# Patient Record
Sex: MALE | Race: WHITE
[De-identification: names, ages, dates, MRNs, and addresses within clinical notes are randomized per-mention and may not be internally consistent; named-entity substitution may affect disease eponyms.]

---

## 2017-01-17 ENCOUNTER — APPOINTMENT (OUTPATIENT)
Dept: PULMONOLOGY | Facility: CLINIC | Age: 72
End: 2017-01-17

## 2017-02-17 ENCOUNTER — APPOINTMENT (OUTPATIENT)
Dept: PULMONOLOGY | Facility: CLINIC | Age: 72
End: 2017-02-17

## 2017-02-17 VITALS
SYSTOLIC BLOOD PRESSURE: 110 MMHG | WEIGHT: 208 LBS | BODY MASS INDEX: 27.57 KG/M2 | HEART RATE: 67 BPM | OXYGEN SATURATION: 100 % | DIASTOLIC BLOOD PRESSURE: 80 MMHG | HEIGHT: 73 IN | RESPIRATION RATE: 17 BRPM

## 2017-06-15 ENCOUNTER — APPOINTMENT (OUTPATIENT)
Dept: PULMONOLOGY | Facility: CLINIC | Age: 72
End: 2017-06-15

## 2017-06-15 VITALS
BODY MASS INDEX: 27.57 KG/M2 | DIASTOLIC BLOOD PRESSURE: 60 MMHG | RESPIRATION RATE: 14 BRPM | WEIGHT: 208 LBS | HEART RATE: 77 BPM | OXYGEN SATURATION: 98 % | SYSTOLIC BLOOD PRESSURE: 105 MMHG | HEIGHT: 73 IN

## 2017-06-15 LAB
T3FREE SERPL-MCNC: 3.12 PG/ML
T4 FREE SERPL-MCNC: 1.2 NG/DL
TSH SERPL-ACNC: 2.51 UIU/ML

## 2017-06-16 ENCOUNTER — RESULT REVIEW (OUTPATIENT)
Age: 72
End: 2017-06-16

## 2017-06-17 LAB
TESTOST BND SERPL-MCNC: 5.6 PG/ML
TESTOST SERPL-MCNC: 468.2 NG/DL

## 2017-06-29 ENCOUNTER — RESULT REVIEW (OUTPATIENT)
Age: 72
End: 2017-06-29

## 2017-11-16 ENCOUNTER — APPOINTMENT (OUTPATIENT)
Dept: PULMONOLOGY | Facility: CLINIC | Age: 72
End: 2017-11-16

## 2017-12-28 ENCOUNTER — RX RENEWAL (OUTPATIENT)
Age: 72
End: 2017-12-28

## 2018-02-22 ENCOUNTER — APPOINTMENT (OUTPATIENT)
Dept: PULMONOLOGY | Facility: CLINIC | Age: 73
End: 2018-02-22
Payer: MEDICARE

## 2018-02-22 VITALS
WEIGHT: 201 LBS | HEART RATE: 71 BPM | SYSTOLIC BLOOD PRESSURE: 120 MMHG | HEIGHT: 72 IN | DIASTOLIC BLOOD PRESSURE: 60 MMHG | RESPIRATION RATE: 14 BRPM | OXYGEN SATURATION: 95 % | BODY MASS INDEX: 27.22 KG/M2

## 2018-02-22 PROCEDURE — 99214 OFFICE O/P EST MOD 30 MIN: CPT | Mod: 25

## 2018-02-22 PROCEDURE — 94010 BREATHING CAPACITY TEST: CPT

## 2018-02-22 RX ORDER — AZITHROMYCIN 500 MG/1
500 TABLET, FILM COATED ORAL DAILY
Qty: 5 | Refills: 0 | Status: DISCONTINUED | COMMUNITY
Start: 2017-12-28 | End: 2018-02-22

## 2018-02-22 RX ORDER — AZITHROMYCIN 250 MG/1
250 TABLET, FILM COATED ORAL
Qty: 6 | Refills: 0 | Status: DISCONTINUED | COMMUNITY
Start: 2017-06-15 | End: 2018-02-22

## 2018-04-25 ENCOUNTER — RX RENEWAL (OUTPATIENT)
Age: 73
End: 2018-04-25

## 2018-05-24 ENCOUNTER — APPOINTMENT (OUTPATIENT)
Dept: ORTHOPEDIC SURGERY | Facility: CLINIC | Age: 73
End: 2018-05-24
Payer: MEDICARE

## 2018-05-24 VITALS
WEIGHT: 206 LBS | HEART RATE: 79 BPM | DIASTOLIC BLOOD PRESSURE: 65 MMHG | HEIGHT: 73 IN | BODY MASS INDEX: 27.3 KG/M2 | SYSTOLIC BLOOD PRESSURE: 106 MMHG

## 2018-05-24 DIAGNOSIS — M67.911 UNSPECIFIED DISORDER OF SYNOVIUM AND TENDON, RIGHT SHOULDER: ICD-10-CM

## 2018-05-24 DIAGNOSIS — M75.41 IMPINGEMENT SYNDROME OF RIGHT SHOULDER: ICD-10-CM

## 2018-05-24 PROCEDURE — 20610 DRAIN/INJ JOINT/BURSA W/O US: CPT | Mod: RT

## 2018-05-24 PROCEDURE — 99203 OFFICE O/P NEW LOW 30 MIN: CPT | Mod: 25

## 2018-05-24 PROCEDURE — 73030 X-RAY EXAM OF SHOULDER: CPT | Mod: RT

## 2018-06-21 ENCOUNTER — NON-APPOINTMENT (OUTPATIENT)
Age: 73
End: 2018-06-21

## 2018-06-21 ENCOUNTER — APPOINTMENT (OUTPATIENT)
Dept: PULMONOLOGY | Facility: CLINIC | Age: 73
End: 2018-06-21
Payer: MEDICARE

## 2018-06-21 VITALS
SYSTOLIC BLOOD PRESSURE: 110 MMHG | WEIGHT: 205 LBS | BODY MASS INDEX: 27.17 KG/M2 | HEART RATE: 87 BPM | HEIGHT: 73 IN | OXYGEN SATURATION: 98 % | RESPIRATION RATE: 17 BRPM | DIASTOLIC BLOOD PRESSURE: 65 MMHG

## 2018-06-21 DIAGNOSIS — Z78.9 OTHER SPECIFIED HEALTH STATUS: ICD-10-CM

## 2018-06-21 DIAGNOSIS — Z82.61 FAMILY HISTORY OF ARTHRITIS: ICD-10-CM

## 2018-06-21 DIAGNOSIS — Z87.09 PERSONAL HISTORY OF OTHER DISEASES OF THE RESPIRATORY SYSTEM: ICD-10-CM

## 2018-06-21 PROCEDURE — 99214 OFFICE O/P EST MOD 30 MIN: CPT | Mod: 25

## 2018-06-21 PROCEDURE — 94010 BREATHING CAPACITY TEST: CPT

## 2018-06-21 RX ORDER — ESOMEPRAZOLE MAGNESIUM 40 MG/1
CAPSULE, DELAYED RELEASE ORAL
Refills: 0 | Status: ACTIVE | COMMUNITY

## 2018-06-21 RX ORDER — OSELTAMIVIR PHOSPHATE 75 MG/1
75 CAPSULE ORAL
Qty: 10 | Refills: 0 | Status: DISCONTINUED | COMMUNITY
Start: 2018-02-12

## 2018-06-21 RX ORDER — MULTIVITAMIN
TABLET ORAL
Refills: 0 | Status: ACTIVE | COMMUNITY

## 2018-10-25 ENCOUNTER — APPOINTMENT (OUTPATIENT)
Dept: PULMONOLOGY | Facility: CLINIC | Age: 73
End: 2018-10-25
Payer: MEDICARE

## 2018-10-25 ENCOUNTER — NON-APPOINTMENT (OUTPATIENT)
Age: 73
End: 2018-10-25

## 2018-10-25 VITALS
SYSTOLIC BLOOD PRESSURE: 121 MMHG | OXYGEN SATURATION: 99 % | DIASTOLIC BLOOD PRESSURE: 86 MMHG | HEIGHT: 72 IN | WEIGHT: 208 LBS | BODY MASS INDEX: 28.17 KG/M2 | RESPIRATION RATE: 17 BRPM | HEART RATE: 72 BPM

## 2018-10-25 PROCEDURE — 94010 BREATHING CAPACITY TEST: CPT

## 2018-10-25 PROCEDURE — 99214 OFFICE O/P EST MOD 30 MIN: CPT | Mod: 25

## 2018-10-25 NOTE — ADDENDUM
[FreeTextEntry1] : Documented by Radha Rain acting as a scribe for Dr. Joel Gould on 10/25/2018.\par \par All medical record entries made by the Scribe were at my, Dr. Joel Gould's, direction and personally dictated by me on 10//25/2018. I have reviewed the chart and agree that the record accurately reflects my personal performance of the history, physical exam, assessment and plan. I have also personally directed, reviewed, and agree with the discharge instructions.

## 2018-10-25 NOTE — ASSESSMENT
[FreeTextEntry1] : Mr. Urbano has a history of RADs, allergies, GERD, and low vitamin D.  He is doing well from a pulmonary perspective aside from some dexterity issues.\par \par problem 1: RADs\par -quiet at this point in time\par -on the shelf is Dulera 200 at 2 inhalations BID\par \par -Inhaler technique reviewed as well as oral hygiene techniques reviewed with patient. Avoidance of cold air, extremes of temperature, rescue inhaler should be used before exercise. Order of medication reviewed with patient. Recommended use of a cool mist humidifier in the bedroom.\par \par problem 2: post nasal drip syndrome\par -continue to use XKlear or Arm and Hammer nasal saline\par \par -Environmental measures for allergies were encouraged including mattress and pillow cover, air purifier, and environmental controls.\par \par problem 3: GERD\par -continue Nexium 20mg every morning \par -continue Pepcid PRN\par -discussed Rule of 2's; pt should avoid eating too much; too fast; too spicy; too lousy; less than two hours before bed \par -Things to avoid including overeating, spicy foods, tight clothing, eating within three hours of bed, this list is not all inclusive. \par -For treatment of reflux, possible options discussed including diet control, H2 blockers, PPIs, as well as coating motility agents discussed as treatment options. Timing of meals and proximity of last meal to sleep were discussed. If symptoms persist, a formal gastrointestinal evaluation is needed. \par \par problem 4: overweight\par -Weight loss, exercise, and diet control were discussed and are highly encouraged. Treatment options were given such as, aqua therapy, and contacting a nutritionist. Recommended to use the elliptical, stationary bike, less use of treadmill.  Obesity is associated with worsening asthma, shortness of breath, and potential for cardiac disease, diabetes, and other underlying medical conditions.\par \par problem 5: low vitamin D\par -continue vitamin D therapy\par \par -Has been associated with asthma exacerbations and increased allergic symptoms. The goal based on recent information is maintaining levels between 50-70 and low normal is 30. Recommended 50,000 units every two weeks to once a month depending on the level. \par \par problem 6: chronic fatigue, snoring, r/o sleep apnea\par -pt is being sent for blood work: thyroid function test (normal) , free and total testosterone level (low)\par -based on fatigue, snoring pt is being set up for a home sleep study (refused)\par \par -Sleep apnea is associated with adverse clinical consequences which an affect most organ systems. Cardiovascular disease risk includes arrhythmias, atrial fibrillation, hypertension, coronary artery disease, and stroke. Metabolic disorders include diabetes type 2, non-alcoholic fatty liver disease. Mood disorder especially depression; and cognitive decline especially in the elderly. Associations with chronic reflux/Shane’s esophagus some but not all inclusive.\par -Reasons include arousal consistent with hypopnea; respiratory events most prominent in REM sleep or supine position; therefore sleep staging and body position are important for accurate diagnosis and estimation of AHI.\par -aside from OSAS other etiologies include thyroid disease, anemia, low testosterone levels,\par and vitamin deficiencies as well as medication effects. Based on this recommended: CBC, thyroid function test, vitamin D levels, sleep study, and free and total testosterone levels. Dr. Gould went over topic multiple times and discussed for an extensive period of time. \par \par problem 7: low testosterone\par -continue Boron supplement 6mg QD\par \par problem 8: low vitamin D \par -add vitamin D supplement \par Has been associated with asthma exacerbations and increased allergic symptoms. The goal based on recent information is maintaining levels between 50-70 and low normal is 30. Recommended 50,000 units every two weeks to once a month depending on the level \par \par problem 9: health maintenance \par -pending 2018 influenza vaccination\par -recommended to increase hydration\par -recommended to try MouthKote\par -recommended strep pneumonia vaccines: Prevnar-13 vaccine, followed by Pneumo vaccine 23 one year following\par -recommended early intervention for URIs\par -recommended regular osteoporosis evaluations\par -recommended early dermatological evaluations\par -recommended after the age of 50 to the age of 70, colonoscopy every 5 years \par  \par \par F/U in 4 months\par He is encouraged to call with any changes, concerns, or questions.

## 2018-10-25 NOTE — HISTORY OF PRESENT ILLNESS
[FreeTextEntry1] : Mr. Urbano is a 72 year old male presenting to the office for a follow up visit for chronic fatigue, GERD, low vitamin D, mild RADs, postnasal drip, and snoring. His chief complaint is dexterity issues. \par He notes he has lost control of his hand and cannot use it as well as he used to be able to\par -He reports he is "even drooling at times and does not feel it"\par -He states his PCP states there is a possibility he is developing Parkinson's disease\par -He reports his balance is poor at times\par -He states he has not been sleeping well secondary to shoulder pain/rotator cuff\par -He states his sense of smell and taste have been good\par -He reports he has been trying to exercise with free weight and activities that do not bother his shoulder\par -He states he does not hydrate well enough and it likely affects his voice\par -He states he has been taking Vitamin D supplements\par -He denies chest pain or pressure, diarrhea, constipation, dysphagia, heartburn, reflux,

## 2018-10-25 NOTE — REASON FOR VISIT
[Follow-Up] : a follow-up visit [FreeTextEntry1] : chronic fatigue, GERD, low vitamin D, mild RADs, postnasal drip, and snoring

## 2018-10-25 NOTE — PROCEDURE
[FreeTextEntry1] : PFT-spi reveals normal flows with FEV1 of 3.22  ,which is   86% of predicted, normal flow volume loop

## 2018-11-09 ENCOUNTER — APPOINTMENT (OUTPATIENT)
Dept: NUCLEAR MEDICINE | Facility: HOSPITAL | Age: 73
End: 2018-11-09
Payer: MEDICARE

## 2018-11-09 ENCOUNTER — OUTPATIENT (OUTPATIENT)
Dept: OUTPATIENT SERVICES | Facility: HOSPITAL | Age: 73
LOS: 1 days | End: 2018-11-09

## 2018-11-09 DIAGNOSIS — G20 PARKINSON'S DISEASE: ICD-10-CM

## 2018-11-09 PROCEDURE — 78607: CPT | Mod: 26

## 2019-01-04 ENCOUNTER — APPOINTMENT (OUTPATIENT)
Dept: NEUROLOGY | Facility: CLINIC | Age: 74
End: 2019-01-04

## 2019-01-18 ENCOUNTER — APPOINTMENT (OUTPATIENT)
Dept: NEUROLOGY | Facility: CLINIC | Age: 74
End: 2019-01-18
Payer: MEDICARE

## 2019-01-18 VITALS
HEART RATE: 74 BPM | DIASTOLIC BLOOD PRESSURE: 76 MMHG | HEIGHT: 72 IN | BODY MASS INDEX: 28.17 KG/M2 | SYSTOLIC BLOOD PRESSURE: 120 MMHG | WEIGHT: 208 LBS

## 2019-01-18 PROCEDURE — 99205 OFFICE O/P NEW HI 60 MIN: CPT

## 2019-01-18 RX ORDER — RASAGILINE 1 MG/1
1 TABLET ORAL
Qty: 30 | Refills: 5 | Status: ACTIVE | COMMUNITY
Start: 2019-01-18

## 2019-01-18 NOTE — HISTORY OF PRESENT ILLNESS
[FreeTextEntry1] : The patient is a 73-year-old right-handed man who comes for a second opinion for parkinsonism (previously seen by Dr. Delgadillo). Last year he developed decreased the severity of the left hand and was referred to Dr. Delgadillo by his primary care doctor. Workup included MRI of the brain which showed mild microvascular disease, as well as the cervical spine showed multilevel disc disease. On November 9, 2018 he had a Fidel scan which showed reduced tracer uptake on the right.\par \par Because of the Fidel scan, he was started on nizatidine, initially 0.5 mg, then increase to 1 mg, which helped his drooling, but his sister is concerned it made him sleepier.\par There've been no changes in the facial expression, but his voice is hoarse her. He does have some drooling during the day. She has no dysphagia. He is mild trouble turning over in bed, which he attributes to a right rotator cuff tear. His handwriting is unchanged, and he is now told activities of daily living or work. He has noticed mild dragging of the left foot on a treadmill, which he does daily. Otherwise his gait is unaffected, and he has not fallen. He has no tremor. \par He has no history of acting out his dreams, but does snore. He is slightly cranky are than usual, but has no depression or anxiety. His memory is good without hallucinations. He has no constipation, changes in smell, urinary issues, or orthostasis. There is no family history of Parkinson's disease or other movement disorders. He was exposed to Agent Orange in [place].\par He has no other neurologic complaints, including no diplopia, numbness, or weakness.

## 2019-01-18 NOTE — DISCUSSION/SUMMARY
[FreeTextEntry1] : In summary, the patient is a 73-year-old right-handed man with less than a year history of decreased dexterity in the left arm. Examination was significant for left sided rigidity and slowing, and mild hypomimia. A Fidel scan showed decreased uptake on the right. Overall, the history and examination is indeed probably most consistent with idiopathic Parkinson's disease. Given the strict asymmetry, I cannot rule out an atypical parkinsonian disorder at this time, though there were no findings and examination that would be specific for one. He was exposed to Agent Orange in the amount which does pose a risk factor for Parkinson's disease, and he is in the process of applying for [place] benefits.\par As for treatment, he's currently taking rasagiline, and is not sure that even is helping him. We discussed the lack of clear data on disease modification, and he is concerned about the cost of the medication. Thus, I recommended a trial of one to 2 weeks without the medication to see if it is indeed making a difference. At any point during this time he can decide if you have to go back on it or not. We also discussed symptomatic medications including dopamine agonists and levodopa. At this point, he is not bothered by his symptoms, and thus there is no reason to add any of them. He does exercise regularly, which I've encouraged him to continue.\par \par I spent approximately 60 minutes with the patient and his family, examining and discussing the above finding and impression. Followup will be in 4-5 months, sooner if new problems arise.

## 2019-01-18 NOTE — PHYSICAL EXAM
[Motor Handedness Right-Handed] : the patient is right hand dominant [Vibration Decrease Leg / Foot Left Toes] : decreased at the toes of the left foot [Person] : oriented to person [Place] : oriented to place [Time] : oriented to time [General Appearance - Alert] : alert [General Appearance - Well Nourished] : well nourished [General Appearance - Well Developed] : well developed [Affect] : the affect was normal [Mood] : the mood was normal [Short Term Intact] : short term memory intact [Remote Intact] : remote memory intact [Registration Intact] : recent registration memory intact [Concentration Intact] : normal concentrating ability [Naming Objects] : no difficulty naming common objects [Repeating Phrases] : no difficulty repeating a phrase [Writing A Sentence] : no difficulty writing a sentence [Fluency] : fluency intact [Comprehension] : comprehension intact [Reading] : reading intact [Cranial Nerves Optic (II)] : visual acuity intact bilaterally,  visual fields full to confrontation, pupils equal round and reactive to light [Cranial Nerves Oculomotor (III)] : extraocular motion intact [Cranial Nerves Trigeminal (V)] : facial sensation intact symmetrically [Cranial Nerves Facial (VII)] : face symmetrical [Cranial Nerves Vestibulocochlear (VIII)] : hearing was intact bilaterally [Cranial Nerves Glossopharyngeal (IX)] : tongue and palate midline [Cranial Nerves Accessory (XI - Cranial And Spinal)] : head turning and shoulder shrug symmetric [Cranial Nerves Hypoglossal (XII)] : there was no tongue deviation with protrusion [Motor Strength] : muscle strength was normal in all four extremities [Sensation Tactile Decrease] : light touch was intact [Proprioception] : proprioception was intact [1+] : Ankle jerk left 1+ [Sclera] : the sclera and conjunctiva were normal [PERRL With Normal Accommodation] : pupils were equal in size, round, reactive to light, with normal accommodation [Extraocular Movements] : extraocular movements were intact [Outer Ear] : the ears and nose were normal in appearance [Hearing Threshold Finger Rub Not [place]] : hearing was normal [Neck Appearance] : the appearance of the neck was normal [Arterial Pulses Carotid] : carotid pulses were normal with no bruits [Bowel Sounds] : normal bowel sounds [Abdomen Soft] : soft [Abdomen Tenderness] : non-tender [No CVA Tenderness] : no ~M costovertebral angle tenderness [Nail Clubbing] : no clubbing  or cyanosis of the fingernails [Musculoskeletal - Swelling] : no joint swelling seen [Skin Color & Pigmentation] : normal skin color and pigmentation [Skin Turgor] : normal skin turgor [] : no rash [Vibration Decrease Arm / Hand Bilateral] : normal in both arms and hands [Vibration Decrease Leg / Foot Both Ankles] : normal at both ankles [Vibration Decrease Leg / Foot Right Toes] : normal at the toes of the right foot [Plantar Reflex Right Only] : normal on the right [Plantar Reflex Left Only] : normal on the left [FreeTextEntry1] : He did have decreased blink rate and mildly decreased facial expression. Voice was normal. Extraocular movements were intact with normal saccades, smooth pursuit, and no square wave jerks seen. Tone was minimally increased of the neck and mildly increased at the left upper extremity. Tone was otherwise normal. Rapid alternating movements were decreased in amplitude and speed at the left upper extremity. Foot tapping was normal bilaterally. He easily got up from the chair without using his arms. Gait was normal based and steady with good heel to toe stride and decreased left arm swing. Pull test was negative. Handwriting was normal. He had no apraxia.

## 2019-05-02 ENCOUNTER — NON-APPOINTMENT (OUTPATIENT)
Age: 74
End: 2019-05-02

## 2019-05-02 ENCOUNTER — APPOINTMENT (OUTPATIENT)
Dept: PULMONOLOGY | Facility: CLINIC | Age: 74
End: 2019-05-02
Payer: MEDICARE

## 2019-05-02 VITALS
WEIGHT: 206 LBS | SYSTOLIC BLOOD PRESSURE: 110 MMHG | HEIGHT: 72 IN | BODY MASS INDEX: 27.9 KG/M2 | DIASTOLIC BLOOD PRESSURE: 80 MMHG | RESPIRATION RATE: 17 BRPM | HEART RATE: 75 BPM | OXYGEN SATURATION: 100 %

## 2019-05-02 PROCEDURE — 94010 BREATHING CAPACITY TEST: CPT

## 2019-05-02 PROCEDURE — 99214 OFFICE O/P EST MOD 30 MIN: CPT | Mod: 25

## 2019-05-02 NOTE — PHYSICAL EXAM
[General Appearance - Well Developed] : well developed [Normal Appearance] : normal appearance [Well Groomed] : well groomed [General Appearance - Well Nourished] : well nourished [No Deformities] : no deformities [General Appearance - In No Acute Distress] : no acute distress [Normal Conjunctiva] : the conjunctiva exhibited no abnormalities [Normal Oropharynx] : normal oropharynx [Eyelids - No Xanthelasma] : the eyelids demonstrated no xanthelasmas [Neck Appearance] : the appearance of the neck was normal [Neck Cervical Mass (___cm)] : no neck mass was observed [Jugular Venous Distention Increased] : there was no jugular-venous distention [Heart Sounds] : normal S1 and S2 [Thyroid Diffuse Enlargement] : the thyroid was not enlarged [Heart Rate And Rhythm] : heart rate and rhythm were normal [Thyroid Nodule] : there were no palpable thyroid nodules [Murmurs] : no murmurs present [Respiration, Rhythm And Depth] : normal respiratory rhythm and effort [Exaggerated Use Of Accessory Muscles For Inspiration] : no accessory muscle use [Auscultation Breath Sounds / Voice Sounds] : lungs were clear to auscultation bilaterally [Abdomen Soft] : soft [Abdomen Tenderness] : non-tender [Abdomen Mass (___ Cm)] : no abdominal mass palpated [Abnormal Walk] : normal gait [Gait - Sufficient For Exercise Testing] : the gait was sufficient for exercise testing [Nail Clubbing] : no clubbing of the fingernails [Cyanosis, Localized] : no localized cyanosis [Petechial Hemorrhages (___cm)] : no petechial hemorrhages [Skin Color & Pigmentation] : normal skin color and pigmentation [] : no rash [No Venous Stasis] : no venous stasis [Skin Lesions] : no skin lesions [No Skin Ulcers] : no skin ulcer [No Xanthoma] : no  xanthoma was observed [Deep Tendon Reflexes (DTR)] : deep tendon reflexes were 2+ and symmetric [Sensation] : the sensory exam was normal to light touch and pinprick [No Focal Deficits] : no focal deficits [Oriented To Time, Place, And Person] : oriented to person, place, and time [Impaired Insight] : insight and judgment were intact [Affect] : the affect was normal [III] : III [FreeTextEntry1] : wax in the ears

## 2019-05-02 NOTE — PROCEDURE
[FreeTextEntry1] : PFT revealed normal flows, with a FEV1 of 3.46 L, which is 93% of predicted, with a mildly irregular inspiratory limb

## 2019-05-02 NOTE — ASSESSMENT
[FreeTextEntry1] : Mr. Urbano has a history of recent Parkinson's, RADs, allergies, GERD, and low vitamin D.  He is doing well from a pulmonary perspective aside from some dexterity issues.\par \par problem 1: RADs\par -quiet at this point in time\par -on the shelf is Dulera 200 at 2 inhalations BID\par \par -Inhaler technique reviewed as well as oral hygiene techniques reviewed with patient. Avoidance of cold air, extremes of temperature, rescue inhaler should be used before exercise. Order of medication reviewed with patient. Recommended use of a cool mist humidifier in the bedroom.\par \par problem 2: post nasal drip syndrome\par -continue to use XKlear or Arm and Hammer nasal saline\par \par -Environmental measures for allergies were encouraged including mattress and pillow cover, air purifier, and environmental controls.\par \par problem 3: GERD\par -continue Nexium 20mg every morning \par -continue Pepcid PRN\par -discussed Rule of 2's; pt should avoid eating too much; too fast; too spicy; too lousy; less than two hours before bed \par -Things to avoid including overeating, spicy foods, tight clothing, eating within three hours of bed, this list is not all inclusive. \par -For treatment of reflux, possible options discussed including diet control, H2 blockers, PPIs, as well as coating motility agents discussed as treatment options. Timing of meals and proximity of last meal to sleep were discussed. If symptoms persist, a formal gastrointestinal evaluation is needed. \par \par problem 4: overweight\par -Weight loss, exercise, and diet control were discussed and are highly encouraged. Treatment options were given such as, aqua therapy, and contacting a nutritionist. Recommended to use the elliptical, stationary bike, less use of treadmill.  Obesity is associated with worsening asthma, shortness of breath, and potential for cardiac disease, diabetes, and other underlying medical conditions.\par \par problem 5: low vitamin D\par -continue vitamin D therapy\par \par -Has been associated with asthma exacerbations and increased allergic symptoms. The goal based on recent information is maintaining levels between 50-70 and low normal is 30. Recommended 50,000 units every two weeks to once a month depending on the level. \par \par problem 6: chronic fatigue, snoring, r/o sleep apnea\par -pt is being sent for blood work: thyroid function test (normal) , free and total testosterone level (low)\par -based on fatigue, snoring pt is being set up for a home sleep study (refused)\par \par -Sleep apnea is associated with adverse clinical consequences which an affect most organ systems. Cardiovascular disease risk includes arrhythmias, atrial fibrillation, hypertension, coronary artery disease, and stroke. Metabolic disorders include diabetes type 2, non-alcoholic fatty liver disease. Mood disorder especially depression; and cognitive decline especially in the elderly. Associations with chronic reflux/Shane’s esophagus some but not all inclusive.\par -Reasons include arousal consistent with hypopnea; respiratory events most prominent in REM sleep or supine position; therefore sleep staging and body position are important for accurate diagnosis and estimation of AHI.\par -aside from OSAS other etiologies include thyroid disease, anemia, low testosterone levels,\par and vitamin deficiencies as well as medication effects. Based on this recommended: CBC, thyroid function test, vitamin D levels, sleep study, and free and total testosterone levels. Dr. Gould went over topic multiple times and discussed for an extensive period of time. \par \par problem 7: low testosterone\par -continue Boron supplement 6mg QD\par \par problem 8: low vitamin D \par -add vitamin D supplement \par Has been associated with asthma exacerbations and increased allergic symptoms. The goal based on recent information is maintaining levels between 50-70 and low normal is 30. Recommended 50,000 units every two weeks to once a month depending on the level \par \par Problem 9: poor balance\par - Prescription given for balance therapy and gait training\par \par problem 10: health maintenance \par -recommended Juan Luis Chi / yoga\par -pending 2018 influenza vaccination\par -recommended to increase hydration\par -recommended to try MouthKote\par -recommended strep pneumonia vaccines: Prevnar-13 vaccine, followed by Pneumo vaccine 23 one year following\par -recommended early intervention for URIs\par -recommended regular osteoporosis evaluations\par -recommended early dermatological evaluations\par -recommended after the age of 50 to the age of 70, colonoscopy every 5 years \par  \par \par F/U in 4 months\par He is encouraged to call with any changes, concerns, or questions.

## 2019-05-02 NOTE — HISTORY OF PRESENT ILLNESS
[FreeTextEntry1] : Mr. Urbano is a 73 year old male presenting to the office for a follow up visit for chronic fatigue, GERD, low vitamin D, mild RADs, postnasal drip, and snoring. His chief complaint is Parkinson's disease.\par - he reports having been diagnosed with Parkinson's disease\par - he notes having a cough and mild wheezing\par -he saw Dr Weller for a second opinion for [place] treatment\par - he wakes up at night around 2 AM about 3 times a week\par - he notes a tightness in his joints\par - he reports exercising by walking on the treadmill, elliptical, and doing weight training\par - his wife reports some snoring\par - he states that he is quality of sleep is generally good\par - he notes that his balance is deteriorating\par - he denies any chest pain, chest pressure, diarrhea, constipation, dysphagia, dizziness, sour taste in the mouth, heartburn, reflux, itchy eyes, itchy ears, leg swelling, leg pain, myalgias or arthralgias\par \par

## 2019-05-02 NOTE — ADDENDUM
[FreeTextEntry1] : Documented by Stephen Rhodes acting as a scribe for Dr. Joel Gould on 05/02/2019 \par \par All medical record entries made by the Scribe were at my, Dr. Joel Gould's, direction and personally dictated by me on 05/02/2019  . I have reviewed the chart and agree that the record accurately reflects my personal performance of the history, physical exam, procedure, assessment and plan. I have also personally directed, reviewed, and agree with the discharge instructions. \par \par \par

## 2019-06-11 ENCOUNTER — APPOINTMENT (OUTPATIENT)
Dept: NEUROLOGY | Facility: CLINIC | Age: 74
End: 2019-06-11
Payer: MEDICARE

## 2019-06-11 VITALS — DIASTOLIC BLOOD PRESSURE: 75 MMHG | SYSTOLIC BLOOD PRESSURE: 105 MMHG | HEART RATE: 79 BPM

## 2019-06-11 PROCEDURE — 99214 OFFICE O/P EST MOD 30 MIN: CPT

## 2019-06-11 NOTE — PHYSICAL EXAM
[Person] : oriented to person [Place] : oriented to place [Remote Intact] : remote memory intact [Short Term Intact] : short term memory intact [Time] : oriented to time [Registration Intact] : recent registration memory intact [Cranial Nerves Optic (II)] : visual acuity intact bilaterally,  visual fields full to confrontation, pupils equal round and reactive to light [Cranial Nerves Oculomotor (III)] : extraocular motion intact [Cranial Nerves Trigeminal (V)] : facial sensation intact symmetrically [Cranial Nerves Facial (VII)] : face symmetrical [Cranial Nerves Vestibulocochlear (VIII)] : hearing was intact bilaterally [Cranial Nerves Glossopharyngeal (IX)] : tongue and palate midline [Cranial Nerves Hypoglossal (XII)] : there was no tongue deviation with protrusion [Cranial Nerves Accessory (XI - Cranial And Spinal)] : head turning and shoulder shrug symmetric [Motor Strength] : muscle strength was normal in all four extremities [Proprioception] : proprioception was intact [Motor Handedness Right-Handed] : the patient is right hand dominant [Sensation Tactile Decrease] : light touch was intact [Vibration Decrease Arm / Hand Bilateral] : normal in both arms and hands [Vibration Decrease Leg / Foot Both Ankles] : normal at both ankles [Vibration Decrease Leg / Foot Left Toes] : decreased at the toes of the left foot [Vibration Decrease Leg / Foot Right Toes] : normal at the toes of the right foot [Plantar Reflex Right Only] : normal on the right [1+] : Ankle jerk left 1+ [Plantar Reflex Left Only] : normal on the left [FreeTextEntry1] : He did have decreased blink rate and mildly decreased facial expression. Voice was normal. Extraocular movements were intact with normal saccades, smooth pursuit, and no square wave jerks seen. Tone was minimally increased of the neck and mildly increased at the left upper extremity. Tone was otherwise normal. Rapid alternating movements were mildly decreased in amplitude and speed at the left upper extremity. Foot tapping was normal bilaterally. He easily got up from the chair without using his arms. Gait was normal based and steady with good heel to toe stride and decreased left arm swing. Pull test was negative. Handwriting was normal. He had no apraxia.

## 2019-06-11 NOTE — HISTORY OF PRESENT ILLNESS
[FreeTextEntry1] : The patient is a 73-year-old right-handed man who comes for a second opinion for parkinsonism (previously seen by Dr. Delgadillo). Last year he developed decreased the severity of the left hand and was referred to Dr. Delgadillo by his primary care doctor. Workup included MRI of the brain which showed mild microvascular disease, as well as the cervical spine showed multilevel disc disease. On November 9, 2018 he had a Fidel scan which showed reduced tracer uptake on the right.\par \par Because of the Fidel scan, he was started on rasagiline, initially 0.5 mg, then increase to 1 mg, which helped his drooling, but his sister is concerned it made him sleepier.\par There have been no changes in the facial expression, but his voice is hoarse her. He does have some drooling during the day. She has no dysphagia. He is mild trouble turning over in bed, which he attributes to a right rotator cuff tear. His handwriting is unchanged, and he is now told activities of daily living or work. He has noticed mild dragging of the left foot on a treadmill, which he does daily. Otherwise his gait is unaffected, and he has not fallen. He has no tremor. \par He has no history of acting out his dreams, but does snore. He is slightly cranky are than usual, but has no depression or anxiety. His memory is good without hallucinations. He has no constipation, changes in smell, urinary issues, or orthostasis. There is no family history of Parkinson's disease or other movement disorders. He was exposed to Agent Orange in [place].\par He has no other neurologic complaints, including no diplopia, numbness, or weakness.\par \par 1. Since last visit, stopped rasagiline, eventually had some balance and drooling issues, went back on it, then ran out of it. No falls. \par 2. [facility] with [facility], eligible for benefits re: agent orange, just got more rasagiline. \par 3. He has no history of acting out his dreams, but does snore. He is slightly cranky are than usual, but has no depression or anxiety. His memory is good without hallucinations. He has no constipation, changes in smell, urinary issues, or orthostasis. \par

## 2019-06-11 NOTE — DISCUSSION/SUMMARY
[FreeTextEntry1] : In summary, the patient is a 73-year-old right-handed man with less than a year history of decreased dexterity in the left arm. Examination was significant for left sided rigidity and slowing, and mild hypomimia. A Fidel scan showed decreased uptake on the right. Overall, the history and examination is indeed probably most consistent with idiopathic Parkinson's disease. Given the strict asymmetry, I cannot rule out an atypical parkinsonian disorder at this time, though there were no findings and examination that would be specific for one. He was exposed to Agent Orange in the amount which does pose a risk factor for Parkinson's disease, and he is in the process of applying for [facility] benefits.\par \par As for treatment, he's currently taking rasagiline, and is not sure that even is helping him. We discussed the lack of clear data on disease modification, and he is concerned about the cost of the medication. \par Thus, I recommended a trial of one to 2 weeks without the medication to see if it is indeed making a difference. At any point during this time he can decide if you have to go back on it or not. We also discussed symptomatic medications including dopamine agonists and levodopa. At this point, he is not bothered by his symptoms, and thus there is no reason to add any of them. He does exercise regularly, which I've encouraged him to continue.\par \par

## 2019-11-07 ENCOUNTER — APPOINTMENT (OUTPATIENT)
Dept: PULMONOLOGY | Facility: CLINIC | Age: 74
End: 2019-11-07
Payer: MEDICARE

## 2019-11-07 ENCOUNTER — NON-APPOINTMENT (OUTPATIENT)
Age: 74
End: 2019-11-07

## 2019-11-07 VITALS
WEIGHT: 203 LBS | DIASTOLIC BLOOD PRESSURE: 70 MMHG | HEIGHT: 72 IN | BODY MASS INDEX: 27.5 KG/M2 | OXYGEN SATURATION: 98 % | RESPIRATION RATE: 17 BRPM | HEART RATE: 78 BPM | SYSTOLIC BLOOD PRESSURE: 110 MMHG

## 2019-11-07 PROCEDURE — 94010 BREATHING CAPACITY TEST: CPT

## 2019-11-07 PROCEDURE — 95012 NITRIC OXIDE EXP GAS DETER: CPT

## 2019-11-07 PROCEDURE — 99214 OFFICE O/P EST MOD 30 MIN: CPT | Mod: 25

## 2019-11-07 RX ORDER — AZITHROMYCIN 250 MG/1
250 TABLET, FILM COATED ORAL
Qty: 1 | Refills: 0 | Status: DISCONTINUED | COMMUNITY
Start: 2019-05-02 | End: 2019-11-07

## 2019-11-07 RX ORDER — AZITHROMYCIN 250 MG/1
250 TABLET, FILM COATED ORAL
Qty: 1 | Refills: 0 | Status: DISCONTINUED | COMMUNITY
Start: 2019-01-28 | End: 2019-11-07

## 2019-11-07 NOTE — ADDENDUM
[FreeTextEntry1] : Documented by Roni Quinteros acting as a scribe for Dr. Joel Gould on 11/07/2019.\par \par All medical record entries made by the Scribe were at my, Dr. Joel Gould's, direction and personally dictated by me on 11/07/2019. I have reviewed the chart and agree that the record accurately reflects my personal performance of the history, physical exam, assessment and plan. I have also personally directed, reviewed, and agree with the discharge instructions. \par

## 2019-11-07 NOTE — ASSESSMENT
[FreeTextEntry1] : Mr. Urbano has a history of recent Parkinson's, RADs, allergies, GERD, and low vitamin D.  He is doing well from a pulmonary perspective and is exercising frequently 3-5 days per week.\par \par problem 1: RADs (controlled)\par -quiet at this point in time\par -on the shelf is Dulera 200 at 2 inhalations BID\par \par -Inhaler technique reviewed as well as oral hygiene techniques reviewed with patient. Avoidance of cold air, extremes of temperature, rescue inhaler should be used before exercise. Order of medication reviewed with patient. Recommended use of a cool mist humidifier in the bedroom.\par \par problem 2: post nasal drip syndrome\par -continue to use XKlear or Arm and Hammer nasal saline\par \par -Environmental measures for allergies were encouraged including mattress and pillow cover, air purifier, and environmental controls.\par \par problem 3: GERD\par -continue Nexium 20mg every morning \par -continue Pepcid PRN\par -discussed Rule of 2's; pt should avoid eating too much; too fast; too spicy; too lousy; less than two hours before bed \par -Things to avoid including overeating, spicy foods, tight clothing, eating within three hours of bed, this list is not all inclusive. \par -For treatment of reflux, possible options discussed including diet control, H2 blockers, PPIs, as well as coating motility agents discussed as treatment options. Timing of meals and proximity of last meal to sleep were discussed. If symptoms persist, a formal gastrointestinal evaluation is needed. \par \par problem 4: overweight\par -Weight loss, exercise, and diet control were discussed and are highly encouraged. Treatment options were given such as, aqua therapy, and contacting a nutritionist. Recommended to use the elliptical, stationary bike, less use of treadmill.  Obesity is associated with worsening asthma, shortness of breath, and potential for cardiac disease, diabetes, and other underlying medical conditions.\par \par problem 5: low vitamin D\par -continue vitamin D therapy\par \par -Has been associated with asthma exacerbations and increased allergic symptoms. The goal based on recent information is maintaining levels between 50-70 and low normal is 30. Recommended 50,000 units every two weeks to once a month depending on the level. \par \par problem 6: chronic fatigue, snoring, r/o sleep apnea\par -pt is being sent for blood work: thyroid function test (normal) , free and total testosterone level (low)\par -based on fatigue, snoring pt is being set up for a home sleep study (refused)\par \par -Sleep apnea is associated with adverse clinical consequences which an affect most organ systems. Cardiovascular disease risk includes arrhythmias, atrial fibrillation, hypertension, coronary artery disease, and stroke. Metabolic disorders include diabetes type 2, non-alcoholic fatty liver disease. Mood disorder especially depression; and cognitive decline especially in the elderly. Associations with chronic reflux/Shane’s esophagus some but not all inclusive.\par -Reasons include arousal consistent with hypopnea; respiratory events most prominent in REM sleep or supine position; therefore sleep staging and body position are important for accurate diagnosis and estimation of AHI.\par -aside from OSAS other etiologies include thyroid disease, anemia, low testosterone levels,\par and vitamin deficiencies as well as medication effects. Based on this recommended: CBC, thyroid function test, vitamin D levels, sleep study, and free and total testosterone levels. Dr. Gould went over topic multiple times and discussed for an extensive period of time. \par \par problem 7: low testosterone\par -continue Boron supplement 6mg QD\par \par problem 8: low vitamin D \par -add vitamin D supplement \par Has been associated with asthma exacerbations and increased allergic symptoms. The goal based on recent information is maintaining levels between 50-70 and low normal is 30. Recommended 50,000 units every two weeks to once a month depending on the level \par \par Problem 9: poor balance - Parkinsons ([place])\par - Prescription given for balance therapy and gait training\par \par problem 10: health maintenance \par -recommended Juan Luis Chi / yoga\par -s/p 2019 influenza vaccination\par -recommended to increase hydration\par -recommended to try MouthKote\par -recommended strep pneumonia vaccines: Prevnar-13 vaccine (s/p), followed by Pneumo vaccine 23 (pending) one year following\par -recommended early intervention for URIs\par -recommended regular osteoporosis evaluations\par -recommended early dermatological evaluations\par -recommended after the age of 50 to the age of 70, colonoscopy every 5 years \par  \par \par F/U in 6 months\par He is encouraged to call with any changes, concerns, or questions.

## 2019-11-07 NOTE — PROCEDURE
[FreeTextEntry1] : PFT revealed normal flows, with a FEV1 of 3.29L, which is 90% of predicted, with a normal flow volume loop \par \par FENO was 6; a normal value being less than 25\par Fractional exhaled nitric oxide (FENO) is regarded as a simple, noninvasive method for assessing eosinophilic airway inflammation. Produced by a variety of cells within the lung, nitric oxide (NO) concentrations are generally low in healthy individuals. However, high concentrations of NO appear to be involved in nonspecific host defense mechanisms and chronic inflammatory diseases such as asthma. The American Thoracic Society (ATS) therefore has recommended using FENO to aid in the diagnosis and monitoring of eosinophilic airway inflammation and asthma, and for identifying steroid responsive individuals whose chronic respiratory symptoms may be caused by airway inflammation.

## 2019-11-07 NOTE — HISTORY OF PRESENT ILLNESS
[FreeTextEntry1] : Mr. Urbano is a 74 year old male presenting to the office for a follow up visit for chronic fatigue, GERD, low vitamin D, mild RADs, postnasal drip, and snoring. His chief complaint is heartburn\par -he reports feeling generally well\par -he reports sleeping well, with about 7 hours of sleep nightly of restful sleep. His wife notes he doesn’t snore significantly anymore\par -he notes his weight is stable\par -he reports exercising more often and regularly using the elliptical, between 3-5 days per week\par -he notes he had laser eye surgery for glaucoma\par -he reports having heartburn, but it is usually controlled with medication\par -he notes his balance is slightly better\par -he states he has seen his neurologist last in June, and will follow up in December (Naseem)\par -he is s/p flu shot\par -he denies any SOB, orthopnea, chest pain, chest pressure, diarrhea, constipation, dysphagia, dizziness, sour taste in the mouth, itchy eyes, itchy ears, reflux

## 2019-12-11 ENCOUNTER — APPOINTMENT (OUTPATIENT)
Dept: NEUROLOGY | Facility: CLINIC | Age: 74
End: 2019-12-11
Payer: MEDICARE

## 2019-12-11 VITALS
WEIGHT: 278 LBS | BODY MASS INDEX: 37.65 KG/M2 | DIASTOLIC BLOOD PRESSURE: 78 MMHG | SYSTOLIC BLOOD PRESSURE: 116 MMHG | HEIGHT: 72 IN | HEART RATE: 80 BPM

## 2019-12-11 PROCEDURE — 99214 OFFICE O/P EST MOD 30 MIN: CPT

## 2019-12-11 NOTE — DISCUSSION/SUMMARY
[FreeTextEntry1] : In summary, the patient is a 74-year-old right-handed man with less than a year history of decreased dexterity in the left arm. Examination was significant for left sided rigidity and slowing, and mild hypomimia. A Fidel scan showed decreased uptake on the right. Overall, the history and examination is indeed probably most consistent with idiopathic Parkinson's disease. Given the strict asymmetry, I cannot rule out an atypical parkinsonian disorder at this time, though there were no findings and examination that would be specific for one. He was exposed to Agent Orange in the amount which does pose a risk factor for Parkinson's disease, and he is in the process of applying for [facility] benefits.\par \par As for treatment, he's currently taking rasagiline, and is not sure that even is helping him. We discussed the lack of clear data on disease modification, and he is concerned about the cost of the medication. \par \par 1. Continue rasagiline. He is fully independent in his ADLs, no need for stronger meds. We discussed that the decision is mostly about him. \par 2. Speech therapy \par 3. Exercise as he is doing\par \par

## 2019-12-11 NOTE — PHYSICAL EXAM
[Person] : oriented to person [Place] : oriented to place [Remote Intact] : remote memory intact [Short Term Intact] : short term memory impaired [Time] : oriented to time [Registration Intact] : recent registration memory intact [Naming Objects] : no difficulty naming common objects [Fluency] : fluency intact [Cranial Nerves Optic (II)] : visual acuity intact bilaterally,  visual fields full to confrontation, pupils equal round and reactive to light [Reading] : reading intact [Cranial Nerves Oculomotor (III)] : extraocular motion intact [Cranial Nerves Vestibulocochlear (VIII)] : hearing was intact bilaterally [Cranial Nerves Facial (VII)] : face symmetrical [Cranial Nerves Glossopharyngeal (IX)] : tongue and palate midline [Cranial Nerves Trigeminal (V)] : facial sensation intact symmetrically [Motor Strength] : muscle strength was normal in all four extremities [Cranial Nerves Accessory (XI - Cranial And Spinal)] : head turning and shoulder shrug symmetric [Cranial Nerves Hypoglossal (XII)] : there was no tongue deviation with protrusion [Sensation Tactile Decrease] : light touch was intact [Motor Handedness Right-Handed] : the patient is right hand dominant [Proprioception] : proprioception was intact [Vibration Decrease Arm / Hand Bilateral] : normal in both arms and hands [Vibration Decrease Leg / Foot Both Ankles] : normal at both ankles [Vibration Decrease Leg / Foot Right Toes] : normal at the toes of the right foot [Vibration Decrease Leg / Foot Left Toes] : decreased at the toes of the left foot [1+] : Ankle jerk left 1+ [Plantar Reflex Right Only] : normal on the right [FreeTextEntry4] : 2/3 delayed recall, 3/3 with hint [FreeTextEntry1] : He did have decreased blink rate and mildly decreased facial expression. Voice was normal. Extraocular movements were intact with normal saccades, smooth pursuit, and no square wave jerks seen. Tone was minimally increased of the neck and increased at the left upper extremity. Tone was otherwise normal. Rapid alternating movements were decreased in amplitude and speed at the left upper extremity. Foot tapping was normal bilaterally. \par He easily got up from the chair without using his arms. Gait was normal based and steady with good heel to toe stride and decreased left arm swing. Pull test was negative. Handwriting was normal. He had no apraxia. [Plantar Reflex Left Only] : normal on the left

## 2019-12-11 NOTE — HISTORY OF PRESENT ILLNESS
[FreeTextEntry1] : The patient is a 74-year-old right-handed man who comes for a second opinion for parkinsonism (previously seen by Dr. Delgadillo). 2018 he developed dexterity of the left hand and was referred to Dr. Delgadillo by his primary care doctor. Workup included MRI of the brain which showed mild microvascular disease, as well as the cervical spine showed multilevel disc disease. On November 9, 2018 he had a Fidel scan which showed reduced tracer uptake on the right.\par \par Because of the Fidel scan, he was started on rasagiline, initially 0.5 mg, then increase to 1 mg, which helped his drooling, but his sister is concerned it made him sleepier.\par There have been no changes in the facial expression, but his voice is hoarse her. He does have some drooling during the day. She has no dysphagia. He is mild trouble turning over in bed, which he attributes to a right rotator cuff tear. His handwriting is unchanged, and he is now told activities of daily living or work. He has noticed mild dragging of the left foot on a treadmill, which he does daily. Otherwise his gait is unaffected, and he has not fallen. He has no tremor. \par He has no history of acting out his dreams, but does snore. He is slightly cranky are than usual, but has no depression or anxiety. His memory is good without hallucinations. He has no constipation, changes in smell, urinary issues, or orthostasis. There is no family history of Parkinson's disease or other movement disorders. He was exposed to Agent Orange in [place].\par He has no other neurologic complaints, including no diplopia, numbness, or weakness.\par \par 1. Since last visit, stopped rasagiline, eventually had some balance and drooling issues, went back on it, then ran out of it. No falls. Back on it, had to hold for 1 week for being on ciprofloxacin. Speech getting lower.  \par 2. [facility] with [facility], eligible for benefits re: agent orange, just got more rasagiline. \par 3. He has no history of acting out his dreams, but does snore. \par 4. He is slightly cranky are than usual, but has no depression or anxiety. His memory is good without hallucinations. He has no constipation, changes in smell, urinary issues, or orthostasis. \par 5. Exercises going to gym

## 2020-05-20 ENCOUNTER — APPOINTMENT (OUTPATIENT)
Dept: ORTHOPEDIC SURGERY | Facility: CLINIC | Age: 75
End: 2020-05-20

## 2020-06-12 ENCOUNTER — APPOINTMENT (OUTPATIENT)
Dept: NEUROLOGY | Facility: CLINIC | Age: 75
End: 2020-06-12
Payer: MEDICARE

## 2020-06-12 VITALS
HEIGHT: 72 IN | DIASTOLIC BLOOD PRESSURE: 86 MMHG | HEART RATE: 79 BPM | WEIGHT: 202 LBS | SYSTOLIC BLOOD PRESSURE: 129 MMHG | BODY MASS INDEX: 27.36 KG/M2

## 2020-06-12 VITALS — TEMPERATURE: 97.2 F

## 2020-06-12 PROCEDURE — 99214 OFFICE O/P EST MOD 30 MIN: CPT

## 2020-06-12 NOTE — HISTORY OF PRESENT ILLNESS
[FreeTextEntry1] : The patient is a 74-year-old right-handed man who comes for a second opinion for parkinsonism (previously seen by Dr. Delgadillo). 2018 he developed dexterity of the left hand and was referred to Dr. Delgadillo by his primary care doctor. Workup included MRI of the brain which showed mild microvascular disease, as well as the cervical spine showed multilevel disc disease. On November 9, 2018 he had a Fidel scan which showed reduced tracer uptake on the right.\par \par Because of the Fidel scan, he was started on rasagiline, initially 0.5 mg, then increase to 1 mg, which helped his drooling, but his sister is concerned it made him sleepier.\par There have been no changes in the facial expression, but his voice is hoarse her. He does have some drooling during the day. She has no dysphagia. He is mild trouble turning over in bed, which he attributes to a right rotator cuff tear. His handwriting is unchanged, and he is now told activities of daily living or work. He has noticed mild dragging of the left foot on a treadmill, which he does daily. Otherwise his gait is unaffected, and he has not fallen. He has no tremor. \par He has no history of acting out his dreams, but does snore. He is slightly cranky are than usual, but has no depression or anxiety. His memory is good without hallucinations. He has no constipation, changes in smell, urinary issues, or orthostasis. There is no family history of Parkinson's disease or other movement disorders. He was exposed to Agent Orange in [place].\par He has no other neurologic complaints, including no diplopia, numbness, or weakness.\par \par 1. Since last visit, stopped rasagiline, eventually had some balance and drooling issues, went back on it, then ran out of it. No falls. Speech getting lower.  Drooling is increased.\par 2. [facility]istered with [facility], eligible for benefits re: agent orange, just got more rasagiline. \par 3. He has no history of acting out his dreams, but does snore. \par 4. He is slightly cranky are than usual, but has no depression or anxiety. His memory is good without hallucinations. He has no constipation, changes in smell, urinary issues, or orthostasis.

## 2020-06-12 NOTE — DISCUSSION/SUMMARY
[FreeTextEntry1] : In summary, the patient is a 74-year-old right-handed man with less than a year history of decreased dexterity in the left arm. Examination was significant for left sided rigidity and slowing, and mild hypomimia. A Fidel scan showed decreased uptake on the right. Overall, the history and examination is indeed probably most consistent with idiopathic Parkinson's disease. Given the strict asymmetry, I cannot rule out an atypical parkinsonian disorder at this time, though there were no findings and examination that would be specific for one. He was exposed to Agent Orange in the amount which does pose a risk factor for Parkinson's disease, and he is in the process of applying for [facility] benefits.\par \par As for treatment, he's currently taking rasagiline, and is not sure that even is helping him. We discussed the lack of clear data on disease modification, and he is concerned about the cost of the medication. He is aware of the left slowing but not limited\par \par 1. Continue rasagiline. He is fully independent in his ADLs, no need for stronger meds. We discussed that the decision is mostly about him. \par 2. Speech therapy. Sialorrhea: Botox if [facility] covers.  \par 3. Exercise as he is doing\par \par We discussed the above impression, plan and recommendations during the visit. Counseling represented more then 50% of the 30 minute visit time\par \par \par

## 2020-06-12 NOTE — PHYSICAL EXAM
[Person] : oriented to person [Time] : oriented to time [Place] : oriented to place [Remote Intact] : remote memory intact [Naming Objects] : no difficulty naming common objects [Registration Intact] : recent registration memory intact [Cranial Nerves Optic (II)] : visual acuity intact bilaterally,  visual fields full to confrontation, pupils equal round and reactive to light [Reading] : reading intact [Fluency] : fluency intact [Cranial Nerves Oculomotor (III)] : extraocular motion intact [Cranial Nerves Vestibulocochlear (VIII)] : hearing was intact bilaterally [Cranial Nerves Facial (VII)] : face symmetrical [Cranial Nerves Glossopharyngeal (IX)] : tongue and palate midline [Motor Strength] : muscle strength was normal in all four extremities [Cranial Nerves Accessory (XI - Cranial And Spinal)] : head turning and shoulder shrug symmetric [Cranial Nerves Hypoglossal (XII)] : there was no tongue deviation with protrusion [Motor Handedness Right-Handed] : the patient is right hand dominant [Vibration Decrease Leg / Foot Both Ankles] : normal at both ankles [Vibration Decrease Arm / Hand Bilateral] : normal in both arms and hands [Vibration Decrease Leg / Foot Left Toes] : decreased at the toes of the left foot [Vibration Decrease Leg / Foot Right Toes] : normal at the toes of the right foot [1+] : Ankle jerk left 1+ [Short Term Intact] : short term memory intact [Plantar Reflex Right Only] : normal on the right [Plantar Reflex Left Only] : normal on the left [FreeTextEntry1] : He did have decreased blink rate and mildly decreased facial expression. Voice was normal. Extraocular movements were intact with normal saccades, smooth pursuit, and no square wave jerks seen. Tone was minimally increased of the neck and increased at the left upper extremity. Tone was otherwise normal. Rapid alternating movements were decreased in amplitude and speed at the left upper extremity. Foot tapping was normal bilaterally. \par \par \par He easily got up from the chair without using his arms. Gait was normal based and steady with good heel to toe stride and decreased left arm swing. Pull test was negative. Handwriting was normal. He had no apraxia.

## 2020-06-24 ENCOUNTER — APPOINTMENT (OUTPATIENT)
Dept: NEUROLOGY | Facility: CLINIC | Age: 75
End: 2020-06-24
Payer: MEDICARE

## 2020-06-24 PROCEDURE — 99213 OFFICE O/P EST LOW 20 MIN: CPT | Mod: 25

## 2020-06-24 PROCEDURE — 64611 CHEMODENERV SALIV GLANDS: CPT

## 2020-06-24 NOTE — PROCEDURE
[FreeTextEntry1] : The patient received injections with botulinum toxin A (botox), diluted at 5 units/0.1 cc via a 30 ga needle into the following sites, in the usual antiseptic manner. Possible side effects including over-weakening, dysphagia, bleeding, and local infection were discussed.\par \par 1. Left parotid gland 50/2\par 2. Right parotid gland 50/2\par \par Total injected 100 units, waste 0 units, total used 100 units \par The patient tolerated the procedure well, with not complications\par

## 2020-06-24 NOTE — HISTORY OF PRESENT ILLNESS
[FreeTextEntry1] : The patient is a 74-year-old right-handed man who comes for a second opinion for parkinsonism (previously seen by Dr. Delgadillo). 2018 he developed dexterity of the left hand and was referred to Dr. Delgadillo by his primary care doctor. Workup included MRI of the brain which showed mild microvascular disease, as well as the cervical spine showed multilevel disc disease. On November 9, 2018 he had a Fidel scan which showed reduced tracer uptake on the right.\par \par Because of the Fidel scan, he was started on rasagiline, initially 0.5 mg, then increase to 1 mg, which helped his drooling, but his sister is concerned it made him sleepier.\par There have been no changes in the facial expression, but his voice is hoarse her. He does have some drooling during the day. She has no dysphagia. He is mild trouble turning over in bed, which he attributes to a right rotator cuff tear. His handwriting is unchanged, and he is now told activities of daily living or work. He has noticed mild dragging of the left foot on a treadmill, which he does daily. Otherwise his gait is unaffected, and he has not fallen. He has no tremor. \par He has no history of acting out his dreams, but does snore. He is slightly cranky are than usual, but has no depression or anxiety. His memory is good without hallucinations. He has no constipation, changes in smell, urinary issues, or orthostasis. There is no family history of Parkinson's disease or other movement disorders. He was exposed to Agent Orange in [place].\par He has no other neurologic complaints, including no diplopia, numbness, or weakness.\par \par 1. Since last visit, stopped rasagiline, eventually had some balance and drooling issues, went back on it, then ran out of it. No falls. Speech getting lower.  Drooling is increased.\par 2. [facility] with [facility], eligible for benefits re: agent orange, just got more rasagiline. \par 3. He has no history of acting out his dreams, but does snore. \par 4. He is slightly cranky are than usual, but has no depression or anxiety. His memory is good without hallucinations. He has no constipation, changes in smell, urinary issues, or orthostasis.\par 5. Here for BTX for sialorrhea

## 2020-06-24 NOTE — DISCUSSION/SUMMARY
[FreeTextEntry1] : In summary, the patient is a 74-year-old right-handed man with less than a year history of decreased dexterity in the left arm. Examination was significant for left sided rigidity and slowing, and mild hypomimia. A Fidel scan showed decreased uptake on the right. Overall, the history and examination is indeed probably most consistent with idiopathic Parkinson's disease. Given the strict asymmetry, I cannot rule out an atypical parkinsonian disorder at this time, though there were no findings and examination that would be specific for one. He was exposed to Agent Orange in the amount which does pose a risk factor for Parkinson's disease, and he is in the process of applying for [facility] benefits.\par \par As for treatment, he's currently taking rasagiline, and is not sure that even is helping him. We discussed the lack of clear data on disease modification, and he is concerned about the cost of the medication. He is aware of the left slowing but not limited\par \par 1. Continue rasagiline. He is fully independent in his ADLs, no need for stronger meds. We discussed that the decision is mostly about him. \par 2. Speech therapy. Sialorrhea: Botox per procedure note\par 3. Exercise as he is doing\par \par We discussed the above impression, plan and recommendations during the visit. Counseling represented more then 50% of the 20 minute visit time\par \par \par

## 2020-06-24 NOTE — PHYSICAL EXAM
[Person] : oriented to person [Time] : oriented to time [Place] : oriented to place [Short Term Intact] : short term memory intact [Remote Intact] : remote memory intact [Registration Intact] : recent registration memory intact [Fluency] : fluency intact [Naming Objects] : no difficulty naming common objects [Reading] : reading intact [Cranial Nerves Optic (II)] : visual acuity intact bilaterally,  visual fields full to confrontation, pupils equal round and reactive to light [Cranial Nerves Oculomotor (III)] : extraocular motion intact [Cranial Nerves Vestibulocochlear (VIII)] : hearing was intact bilaterally [Cranial Nerves Facial (VII)] : face symmetrical [Cranial Nerves Glossopharyngeal (IX)] : tongue and palate midline [Cranial Nerves Accessory (XI - Cranial And Spinal)] : head turning and shoulder shrug symmetric [Cranial Nerves Hypoglossal (XII)] : there was no tongue deviation with protrusion [Motor Strength] : muscle strength was normal in all four extremities [Motor Handedness Right-Handed] : the patient is right hand dominant [1+] : Ankle jerk left 1+ [Plantar Reflex Right Only] : normal on the right [FreeTextEntry1] : He did have decreased blink rate and mildly decreased facial expression. Voice was normal. Extraocular movements were intact with normal saccades, smooth pursuit, and no square wave jerks seen. Tone was minimally increased of the neck and increased at the left upper extremity. Tone was otherwise normal. Rapid alternating movements were decreased in amplitude and speed at the left upper extremity. Foot tapping was normal bilaterally. \par \par \par He easily got up from the chair without using his arms. Gait was normal based and steady with good heel to toe stride and decreased left arm swing. Pull test was negative. Handwriting was normal. He had no apraxia. [Plantar Reflex Left Only] : normal on the left

## 2020-08-28 ENCOUNTER — APPOINTMENT (OUTPATIENT)
Dept: PULMONOLOGY | Facility: CLINIC | Age: 75
End: 2020-08-28
Payer: MEDICARE

## 2020-08-28 PROCEDURE — 99214 OFFICE O/P EST MOD 30 MIN: CPT | Mod: 95

## 2020-08-31 NOTE — ASSESSMENT
[FreeTextEntry1] : Mr. Urbano is 74 year old Male who has a history of recent Parkinson's, RADs, allergies, GERD, and low vitamin D.  He is doing well from a pulmonary perspective and is exercising frequently 3-5 days per week - #1 issue is Prostate. \par \par problem 1: RADs (controlled)\par -quiet at this point in time\par -on the shelf is Dulera 200 at 2 inhalations BID\par \par -Inhaler technique reviewed as well as oral hygiene techniques reviewed with patient. Avoidance of cold air, extremes of temperature, rescue inhaler should be used before exercise. Order of medication reviewed with patient. Recommended use of a cool mist humidifier in the bedroom.\par \par problem 2: post nasal drip syndrome\par -continue to use Xlear or Arm and Hammer nasal saline\par \par -Environmental measures for allergies were encouraged including mattress and pillow cover, air purifier, and environmental controls.\par \par problem 3: GERD\par -continue Nexium 20mg every morning \par -continue Pepcid PRN\par -discussed Rule of 2's; pt should avoid eating too much; too fast; too spicy; too lousy; less than two hours before bed \par -Things to avoid including overeating, spicy foods, tight clothing, eating within three hours of bed, this list is not all inclusive. \par -For treatment of reflux, possible options discussed including diet control, H2 blockers, PPIs, as well as coating motility agents discussed as treatment options. Timing of meals and proximity of last meal to sleep were discussed. If symptoms persist, a formal gastrointestinal evaluation is needed. \par \par problem 4: overweight (improved) \par -Weight loss, exercise, and diet control were discussed and are highly encouraged. Treatment options were given such as, aqua therapy, and contacting a nutritionist. Recommended to use the elliptical, stationary bike, less use of treadmill.  Obesity is associated with worsening asthma, shortness of breath, and potential for cardiac disease, diabetes, and other underlying medical conditions.\par \par problem 5: low vitamin D\par -continue vitamin D therapy\par \par -Has been associated with asthma exacerbations and increased allergic symptoms. The goal based on recent information is maintaining levels between 50-70 and low normal is 30. Recommended 50,000 units every two weeks to once a month depending on the level. \par \par problem 6: chronic fatigue, snoring, r/o sleep apnea\par -pt is being sent for blood work: thyroid function test (normal) , free and total testosterone level (low)\par -based on fatigue, snoring pt is being set up for a home sleep study (refused)\par \par -Sleep apnea is associated with adverse clinical consequences which an affect most organ systems. Cardiovascular disease risk includes arrhythmias, atrial fibrillation, hypertension, coronary artery disease, and stroke. Metabolic disorders include diabetes type 2, non-alcoholic fatty liver disease. Mood disorder especially depression; and cognitive decline especially in the elderly. Associations with chronic reflux/Shane’s esophagus some but not all inclusive.\par -Reasons include arousal consistent with hypopnea; respiratory events most prominent in REM sleep or supine position; therefore sleep staging and body position are important for accurate diagnosis and estimation of AHI.\par -aside from OSAS other etiologies include thyroid disease, anemia, low testosterone levels,\par and vitamin deficiencies as well as medication effects. Based on this recommended: CBC, thyroid function test, vitamin D levels, sleep study, and free and total testosterone levels. Dr. Gould went over topic multiple times and discussed for an extensive period of time. \par \par problem 7: low testosterone\par -continue Boron supplement 6mg QD\par \par problem 8: low vitamin D \par -add vitamin D supplement \par Has been associated with asthma exacerbations and increased allergic symptoms. The goal based on recent information is maintaining levels between 50-70 and low normal is 30. Recommended 50,000 units every two weeks to once a month depending on the level \par \par Problem 9: poor balance - Parkinsons ([place])\par - Prescription given for balance therapy and gait training\par \par problem 10: health maintenance \par - evaluation - Dr. Madrigal\par -recommended Juan Luis Chi / yoga\par -s/p 2019 influenza vaccination\par -recommended to increase hydration\par -recommended to try MouthKote\par -recommended strep pneumonia vaccines: Prevnar-13 vaccine (s/p), followed by Pneumo vaccine 23 (pending) one year following\par -recommended early intervention for URIs\par -recommended regular osteoporosis evaluations\par -recommended early dermatological evaluations\par -recommended after the age of 50 to the age of 70, colonoscopy every 5 years \par  \par \par F/U in 6 months\par He is encouraged to call with any changes, concerns, or questions.

## 2020-08-31 NOTE — PHYSICAL EXAM
[No Acute Distress] : no acute distress [Well Groomed] : well groomed [Well Nourished] : well nourished [Normal Appearance] : normal appearance [No Deformities] : no deformities [Well Developed] : well developed [TextBox_2] : Appears Well.

## 2020-08-31 NOTE — END OF VISIT
[Time Spent: ___ minutes] : I have spent [unfilled] minutes of time on the encounter. [FreeTextEntry3] : We spent 25 minutes of time today via Telephonic Consult with expressed verbal consent.

## 2020-08-31 NOTE — REASON FOR VISIT
[Follow-Up] : a follow-up visit [FreeTextEntry1] : Video Telehealth - chronic fatigue, GERD, low vitamin D, mild RADs, postnasal drip, and snoring

## 2020-08-31 NOTE — HISTORY OF PRESENT ILLNESS
[FreeTextEntry1] : Mr. Urbano is a 74 year old male video calls to the office for a follow up visit for chronic fatigue, GERD, low vitamin D, mild RADs, postnasal drip, and snoring. His chief complaint is prostate. \par -he has been feeling generally well. \par -has been walking as a form of exercise. \par -energy level is good being 9-10/10. \par -he reports that he thinks he needs to see a Urologist for his prostate. \par -balance is about 7/10. \par -sinuses are good and well. \par -he reports that he is still exercising 3-5 times a week. \par \par -He denies any chest pain, chest pressure, diarrhea, constipation, dysphagia, dizziness, sour taste in the mouth, leg swelling, leg pain, itchy eyes, itchy ears, heartburn, reflux.

## 2020-09-22 ENCOUNTER — APPOINTMENT (OUTPATIENT)
Dept: NEUROLOGY | Facility: CLINIC | Age: 75
End: 2020-09-22
Payer: MEDICARE

## 2020-09-22 VITALS — TEMPERATURE: 97.3 F

## 2020-09-22 PROCEDURE — 99214 OFFICE O/P EST MOD 30 MIN: CPT | Mod: 25

## 2020-09-22 PROCEDURE — 64611 CHEMODENERV SALIV GLANDS: CPT

## 2020-09-22 NOTE — HISTORY OF PRESENT ILLNESS
[FreeTextEntry1] : The patient is a 74-year-old right-handed man who comes for a second opinion for parkinsonism (previously seen by Dr. Delgadillo). 2018 he developed dexterity of the left hand and was referred to Dr. Delgadillo by his primary care doctor. Workup included MRI of the brain which showed mild microvascular disease, as well as the cervical spine showed multilevel disc disease. On November 9, 2018 he had a Fidel scan which showed reduced tracer uptake on the right.\par \par Because of the Fidel scan, he was started on rasagiline, initially 0.5 mg, then increase to 1 mg, which helped his drooling, but his sister is concerned it made him sleepier.\par There have been no changes in the facial expression, but his voice is hoarse her. He does have some drooling during the day. She has no dysphagia. He is mild trouble turning over in bed, which he attributes to a right rotator cuff tear. His handwriting is unchanged, and he is now told activities of daily living or work. He has noticed mild dragging of the left foot on a treadmill, which he does daily. Otherwise his gait is unaffected, and he has not fallen. He has no tremor. \par He has no history of acting out his dreams, but does snore. He is slightly cranky are than usual, but has no depression or anxiety. His memory is good without hallucinations. He has no constipation, changes in smell, urinary issues, or orthostasis. There is no family history of Parkinson's disease or other movement disorders. He was exposed to Agent Orange in [place].\par He has no other neurologic complaints, including no diplopia, numbness, or weakness.\par \par 1. When he stopped rasagiline, eventually had some balance and drooling issues, went back on it, then ran out of it. No falls. Speech getting lower.  Drooling is increased somewhat. \par 2. [facility] with [facility], eligible for benefits re: agent orange, just got more rasagiline. \par 3. He has no history of acting out his dreams, but does snore. \par 4. He is slightly cranky are than usual, but has no depression or anxiety. His memory is good without hallucinations. He has no constipation, changes in smell, urinary issues, or orthostasis. Developed some blurry vision, according to ophthalmologist has dry eye, discussed laser or tear duct. \par 5. Btx did not work well for sialorrhea, wants to try myobloc

## 2020-09-22 NOTE — PROCEDURE
[FreeTextEntry1] : The patient received injections with botulinum toxin B (myobloc), diluted at 500 units/0.1 cc via a 30 ga needle into the following sites, in the usual antiseptic manner. Possible side effects including over-weakening, dysphagia, bleeding, and local infection were discussed.\par \par 1. Left parotid gland 750/2\par 2. Right parotid gland 750/2\par \par Total injected 1500 units, waste 1000 units, total used 2500 units \par The patient tolerated the procedure well, with not complications\par

## 2020-09-22 NOTE — PHYSICAL EXAM
[Person] : oriented to person [Place] : oriented to place [Time] : oriented to time [Short Term Intact] : short term memory intact [Remote Intact] : remote memory intact [Registration Intact] : recent registration memory intact [Naming Objects] : no difficulty naming common objects [Fluency] : fluency intact [Reading] : reading intact [Cranial Nerves Optic (II)] : visual acuity intact bilaterally,  visual fields full to confrontation, pupils equal round and reactive to light [Cranial Nerves Oculomotor (III)] : extraocular motion intact [Cranial Nerves Facial (VII)] : face symmetrical [Cranial Nerves Vestibulocochlear (VIII)] : hearing was intact bilaterally [Cranial Nerves Glossopharyngeal (IX)] : tongue and palate midline [Cranial Nerves Accessory (XI - Cranial And Spinal)] : head turning and shoulder shrug symmetric [Cranial Nerves Hypoglossal (XII)] : there was no tongue deviation with protrusion [Motor Strength] : muscle strength was normal in all four extremities [Motor Handedness Right-Handed] : the patient is right hand dominant [1+] : Ankle jerk left 1+ [Plantar Reflex Right Only] : normal on the right [Plantar Reflex Left Only] : normal on the left [FreeTextEntry1] : He did have decreased blink rate and mildly decreased facial expression. Voice was normal. Extraocular movements were intact with normal saccades, smooth pursuit, and no square wave jerks seen. \par Tone was minimally increased of the neck and increased at the left upper extremity. Tone was otherwise normal. Rapid alternating movements were decreased in amplitude and speed at the left upper extremity. Foot tapping was normal bilaterally. \par \par He easily got up from the chair without using his arms. Gait was normal based and steady with good heel to toe stride and decreased left arm swing. Pull test was negative. Handwriting was normal. He had no apraxia.

## 2020-09-22 NOTE — DISCUSSION/SUMMARY
[FreeTextEntry1] : In summary, the patient is a 74-year-old right-handed man with less than a year history of decreased dexterity in the left arm. Examination was significant for left sided rigidity and slowing, and mild hypomimia. A Fidel scan showed decreased uptake on the right. Overall, the history and examination is indeed probably most consistent with idiopathic Parkinson's disease. Given the strict asymmetry, I cannot rule out an atypical parkinsonian disorder at this time, though there were no findings and examination that would be specific for one. He was exposed to Agent Orange in the amount which does pose a risk factor for Parkinson's disease, and he is in the process of applying for [facility] benefits.\par \par As for treatment, he's currently taking rasagiline, and is not sure that even is helping him. We discussed the lack of clear data on disease modification, and he is concerned about the cost of the medication. He is aware of the left slowing but not limited\par \par 1. Continue rasagiline. He is fully independent in his ADLs, no need for stronger meds. We discussed that the decision is mostly about him. He will check with [facility] if [facility] are covered\par 2. Speech therapy. Sialorrhea: Myobloc per procedure note\par 3. Exercise as he is doing, rock-steady boxing. \par \par We discussed the above impression, plan and recommendations during the visit. Counseling represented more then 50% of the 30 minute visit time\par \par \par

## 2020-11-10 ENCOUNTER — APPOINTMENT (OUTPATIENT)
Dept: ORTHOPEDIC SURGERY | Facility: CLINIC | Age: 75
End: 2020-11-10

## 2020-11-13 ENCOUNTER — APPOINTMENT (OUTPATIENT)
Dept: NEUROLOGY | Facility: CLINIC | Age: 75
End: 2020-11-13

## 2020-12-23 DIAGNOSIS — Z01.812 ENCOUNTER FOR PREPROCEDURAL LABORATORY EXAMINATION: ICD-10-CM

## 2020-12-29 ENCOUNTER — APPOINTMENT (OUTPATIENT)
Dept: NEUROLOGY | Facility: CLINIC | Age: 75
End: 2020-12-29
Payer: MEDICARE

## 2020-12-29 VITALS — TEMPERATURE: 97.1 F

## 2020-12-29 PROCEDURE — 64611 CHEMODENERV SALIV GLANDS: CPT

## 2020-12-29 PROCEDURE — 99214 OFFICE O/P EST MOD 30 MIN: CPT | Mod: 25

## 2020-12-29 RX ORDER — CICLOPIROX OLAMINE 7.7 MG/ML
0.77 SUSPENSION TOPICAL
Qty: 60 | Refills: 0 | Status: ACTIVE | COMMUNITY
Start: 2020-08-17

## 2020-12-29 NOTE — PHYSICAL EXAM
[Person] : oriented to person [Place] : oriented to place [Time] : oriented to time [Short Term Intact] : short term memory intact [Remote Intact] : remote memory intact [Registration Intact] : recent registration memory intact [Naming Objects] : no difficulty naming common objects [Fluency] : fluency intact [Reading] : reading intact [Cranial Nerves Optic (II)] : visual acuity intact bilaterally,  visual fields full to confrontation, pupils equal round and reactive to light [Cranial Nerves Oculomotor (III)] : extraocular motion intact [Cranial Nerves Facial (VII)] : face symmetrical [Cranial Nerves Vestibulocochlear (VIII)] : hearing was intact bilaterally [Cranial Nerves Glossopharyngeal (IX)] : tongue and palate midline [Cranial Nerves Accessory (XI - Cranial And Spinal)] : head turning and shoulder shrug symmetric [Cranial Nerves Hypoglossal (XII)] : there was no tongue deviation with protrusion [Motor Strength] : muscle strength was normal in all four extremities [Motor Handedness Right-Handed] : the patient is right hand dominant [1+] : Ankle jerk left 1+ [Plantar Reflex Right Only] : normal on the right [Plantar Reflex Left Only] : normal on the left [FreeTextEntry1] : He did have decreased blink rate and mildly decreased facial expression. Voice was normal. Extraocular movements were intact with normal saccades, smooth pursuit, and no square wave jerks seen. \par Tone was minimally increased of the neck and increased at the left upper extremity. Tone was otherwise normal. Rapid alternating movements were decreased in amplitude and speed at the left upper extremity. Foot tapping was normal bilaterally. \par \par He easily got up from the chair without using his arms. Gait was normal based and steady with good heel to toe stride and decreased left arm swing. Pull test was negative.  He had no apraxia. [FreeTextEntry7] : Decreased vibratory sense in feet. SCOTT ok.

## 2020-12-29 NOTE — DISCUSSION/SUMMARY
[FreeTextEntry1] : In summary, the patient is a 75-year-old right-handed man with less than a year history of decreased dexterity in the left arm. Examination was significant for left sided rigidity and slowing, and mild hypomimia. A Fidel scan showed decreased uptake on the right. Overall, the history and examination is indeed probably most consistent with idiopathic Parkinson's disease. Given the strict asymmetry, I cannot rule out an atypical parkinsonian disorder at this time, though there were no findings and examination that would be specific for one. He was exposed to Agent Orange in the amount which does pose a risk factor for Parkinson's disease, and he is in the process of applying for [facility] benefits.\par \par As for treatment, he's currently taking rasagiline, and is not sure that even is helping him. We discussed the lack of clear data on disease modification, and he is concerned about the cost of the medication. He is aware of the left slowing but not limited\par \par 1. Continue rasagiline. He is fully independent in his ADLs, no need for stronger meds. We discussed that the decision is mostly about him. Per [facility], [facility] require letter of medical necessity\par 2. Speech therapy. Sialorrhea: Myobloc per procedure note\par 3. Exercise as he is doing, rock-steady boxing. Speech therapy\par 4. Labs for neuropathy\par \par We discussed the above impression, plan and recommendations during the visit. Counseling represented more then 50% of the 30 minute visit time\par \par \par

## 2020-12-29 NOTE — HISTORY OF PRESENT ILLNESS
[FreeTextEntry1] : The patient is a 75-year-old right-handed man who comes for a second opinion for parkinsonism (previously seen by Dr. Delgadillo). 2018 he developed dexterity of the left hand and was referred to Dr. Delgadillo by his primary care doctor. Workup included MRI of the brain which showed mild microvascular disease, as well as the cervical spine showed multilevel disc disease. On November 9, 2018 he had a Fidel scan which showed reduced tracer uptake on the right. Because of the Fidel scan, he was started on rasagiline, initially 0.5 mg, then increase to 1 mg, which helped his drooling, but his sister is concerned it made him sleepier.\par There have been no changes in the facial expression, but his voice is hoarse her. He does have some drooling during the day. She has no dysphagia. He is mild trouble turning over in bed, which he attributes to a right rotator cuff tear. His handwriting is unchanged, and he is now told activities of daily living or work. He has noticed mild dragging of the left foot on a treadmill, which he does daily. Otherwise his gait is unaffected, and he has not fallen. He has no tremor. \par He has no history of acting out his dreams, but does snore. He is slightly cranky are than usual, but has no depression or anxiety. His memory is good without hallucinations. He has no constipation, changes in smell, urinary issues, or orthostasis. There is no family history of Parkinson's disease or other movement disorders. He was exposed to Agent Orange in [place].\par He has no other neurologic complaints, including no diplopia, numbness, or weakness.\par \par 1. When he stopped rasagiline, eventually had some balance and drooling issues, went back on it, then ran out of it. No falls. Speech getting lower. Walking stable, no falls. ADLs ok. \par 2. [facility] with [facility], eligible for benefits re: agent orange, just got more rasagiline. [facility] will start speech therapy, exercises on his own (treadmill).\par 3. He has no history of acting out his dreams, but does snore. \par 4. He is slightly cranky are than usual, but has no depression or anxiety. His memory is good, no hallucinations. He has no constipation, changes in smell, urinary issues, or orthostasis. Developed some blurry vision, according to ophthalmologist has dry eye, discussed laser or tear duct. \par 5. Btx did not work well for sialorrhea, myobloc seemed better\par 6. Toes feel numb on both sides sometimes.

## 2020-12-30 LAB
FOLATE SERPL-MCNC: >20 NG/ML
VIT B12 SERPL-MCNC: 554 PG/ML

## 2021-01-07 ENCOUNTER — APPOINTMENT (OUTPATIENT)
Dept: PULMONOLOGY | Facility: CLINIC | Age: 76
End: 2021-01-07
Payer: MEDICARE

## 2021-01-07 VITALS — HEIGHT: 72 IN | BODY MASS INDEX: 27.63 KG/M2 | WEIGHT: 204 LBS

## 2021-01-07 PROCEDURE — 99214 OFFICE O/P EST MOD 30 MIN: CPT | Mod: 95

## 2021-01-07 NOTE — ADDENDUM
[FreeTextEntry1] : Documented by Per Alejandra acting as a scribe for Dr. Joel Gould on (01/07/2021).\par \par All medical record entries made by the Scribe were at my, Dr. Joel Gould's, direction and personally dictated by me on (01/07/2021). I have reviewed the chart and agree that the record accurately reflects my personal performance of the history, physical exam, assessment and plan. I have also personally directed, reviewed, and agree with the discharge instructions.

## 2021-01-07 NOTE — HISTORY OF PRESENT ILLNESS
[Home] : at home, [unfilled] , at the time of the visit. [Medical Office: ([facility])___] : at the medical office located in  [Verbal consent obtained from patient] : the patient, [unfilled] [FreeTextEntry1] : Mr. Urbano is a 75 year old male video calls to the office for a follow up visit for chronic fatigue, GERD, low vitamin D, mild RADs, postnasal drip, and snoring. His chief complaint is \par -His wife notes him slowing down physically\par -He notes feeling well in general \par -He notes getting enough sleep, getting 7-10 hours per night \par -He notes occasionally coughing \par -He notes no changes in medication \par -He notes walking, and using free weights/calisthenics for exercise without limitations \par -He notes his energy level is 7/10 \par -He notes some reflux but is mainly controlled \par - No new medications, vitamins or supplements \par \par - denies any chest pain, chest pressure, diarrhea, constipation, dysphagia, dizziness, leg swelling, leg pain, itchy eyes, itchy ears, heartburn, or sour taste in the mouth.

## 2021-01-07 NOTE — ASSESSMENT
[FreeTextEntry1] : Mr. Urbano is 75 year old Male who has a history of recent Parkinson's, RADs, allergies, GERD, and low vitamin D.  He is doing well from a pulmonary perspective and is exercising frequently 3-5 days per week - #1 issue is Parkinsons  decline \par \par problem 1: RADs (controlled)\par -quiet at this point in time\par -on the shelf is Dulera 200 at 2 inhalations BID\par \par -Inhaler technique reviewed as well as oral hygiene techniques reviewed with patient. Avoidance of cold air, extremes of temperature, rescue inhaler should be used before exercise. Order of medication reviewed with patient. Recommended use of a cool mist humidifier in the bedroom.\par \par problem 2: post nasal drip syndrome\par -continue to use Xlear or Arm and Hammer nasal saline\par \par -Environmental measures for allergies were encouraged including mattress and pillow cover, air purifier, and environmental controls.\par \par problem 3: GERD\par -continue Nexium 20mg every morning \par -continue Pepcid PRN\par -discussed Rule of 2's; pt should avoid eating too much; too fast; too spicy; too lousy; less than two hours before bed \par -Things to avoid including overeating, spicy foods, tight clothing, eating within three hours of bed, this list is not all inclusive. \par -For treatment of reflux, possible options discussed including diet control, H2 blockers, PPIs, as well as coating motility agents discussed as treatment options. Timing of meals and proximity of last meal to sleep were discussed. If symptoms persist, a formal gastrointestinal evaluation is needed. \par \par problem 4: overweight (improved) \par -Weight loss, exercise, and diet control were discussed and are highly encouraged. Treatment options were given such as, aqua therapy, and contacting a nutritionist. Recommended to use the elliptical, stationary bike, less use of treadmill.  Obesity is associated with worsening asthma, shortness of breath, and potential for cardiac disease, diabetes, and other underlying medical conditions.\par \par problem 5: low vitamin D\par -continue vitamin D therapy\par \par -Has been associated with asthma exacerbations and increased allergic symptoms. The goal based on recent information is maintaining levels between 50-70 and low normal is 30. Recommended 50,000 units every two weeks to once a month depending on the level. \par \par problem 6: chronic fatigue, snoring, r/o sleep apnea\par -pt is being sent for blood work: thyroid function test (normal) , free and total testosterone level (low)\par -based on fatigue, snoring pt is being set up for a home sleep study (refused)\par \par -Sleep apnea is associated with adverse clinical consequences which an affect most organ systems. Cardiovascular disease risk includes arrhythmias, atrial fibrillation, hypertension, coronary artery disease, and stroke. Metabolic disorders include diabetes type 2, non-alcoholic fatty liver disease. Mood disorder especially depression; and cognitive decline especially in the elderly. Associations with chronic reflux/Shane’s esophagus some but not all inclusive.\par -Reasons include arousal consistent with hypopnea; respiratory events most prominent in REM sleep or supine position; therefore sleep staging and body position are important for accurate diagnosis and estimation of AHI.\par -aside from OSAS other etiologies include thyroid disease, anemia, low testosterone levels,\par and vitamin deficiencies as well as medication effects. Based on this recommended: CBC, thyroid function test, vitamin D levels, sleep study, and free and total testosterone levels. Dr. Gould went over topic multiple times and discussed for an extensive period of time. \par \par problem 7: low testosterone\par -continue Boron supplement 6mg QD\par \par problem 8: low vitamin D \par -add vitamin D supplement \par Has been associated with asthma exacerbations and increased allergic symptoms. The goal based on recent information is maintaining levels between 50-70 and low normal is 30. Recommended 50,000 units every two weeks to once a month depending on the level \par \par Problem 9: poor balance - Parkinsons ([place])\par - Prescription given for balance therapy and gait training\par \par problem 10: health maintenance \par - evaluation - Dr. Madrigal\par -recommended Juan Luis Chi / yoga\par -s/p 2020 influenza vaccination\par -recommended to increase hydration\par -recommended to try MouthKote\par -recommended strep pneumonia vaccines: Prevnar-13 vaccine (s/p), followed by Pneumo vaccine 23 (completed)one year following\par -recommended early intervention for URIs\par -recommended regular osteoporosis evaluations\par -recommended early dermatological evaluations\par -recommended after the age of 50 to the age of 70, colonoscopy every 5 years \par  \par \par F/U in 6 months\par He is encouraged to call with any changes, concerns, or questions.

## 2021-03-30 ENCOUNTER — APPOINTMENT (OUTPATIENT)
Dept: NEUROLOGY | Facility: CLINIC | Age: 76
End: 2021-03-30

## 2021-04-01 ENCOUNTER — APPOINTMENT (OUTPATIENT)
Dept: NEUROLOGY | Facility: CLINIC | Age: 76
End: 2021-04-01
Payer: MEDICARE

## 2021-04-01 ENCOUNTER — NON-APPOINTMENT (OUTPATIENT)
Age: 76
End: 2021-04-01

## 2021-04-01 VITALS
BODY MASS INDEX: 27.63 KG/M2 | HEART RATE: 79 BPM | SYSTOLIC BLOOD PRESSURE: 134 MMHG | HEIGHT: 72 IN | WEIGHT: 204 LBS | DIASTOLIC BLOOD PRESSURE: 81 MMHG

## 2021-04-01 VITALS — DIASTOLIC BLOOD PRESSURE: 75 MMHG | SYSTOLIC BLOOD PRESSURE: 110 MMHG | HEART RATE: 80 BPM

## 2021-04-01 VITALS — TEMPERATURE: 97.7 F

## 2021-04-01 PROCEDURE — 99214 OFFICE O/P EST MOD 30 MIN: CPT | Mod: 25

## 2021-04-01 PROCEDURE — 64611 CHEMODENERV SALIV GLANDS: CPT

## 2021-04-01 NOTE — DISCUSSION/SUMMARY
[FreeTextEntry1] : In summary, the patient is a 75-year-old right-handed man with history of parkinsonism.  Currently on rasagiline.  Sialorrhea is better with Myobloc injections.\par Plan\par 1. Continue rasagiline. \par 2. Speech therapy. \par 3. Sialorrhea: Myobloc per procedure note\par 4.  He wanted a form for handicap parking filled out.  He will bring the form in tomorrow\par Patient will follow up in 3 months with Dr. Weller\par \par \par \par \par

## 2021-04-01 NOTE — HISTORY OF PRESENT ILLNESS
[FreeTextEntry1] : The patient is a 75-year-old right-handed with history of Parkinson's disease.  Today patient presents in follow-up and for repeat myoblock injections for sialorrhea\par Patient states that the last set of injections worked well for him.  He denies any side effects now he can tell that the medication is wearing off.  He is waiting to start speech therapy

## 2021-04-01 NOTE — PHYSICAL EXAM
[Person] : oriented to person [Place] : oriented to place [Time] : oriented to time [Short Term Intact] : short term memory intact [Remote Intact] : remote memory intact [Registration Intact] : recent registration memory intact [Naming Objects] : no difficulty naming common objects [Fluency] : fluency intact [Reading] : reading intact [Cranial Nerves Oculomotor (III)] : extraocular motion intact [Cranial Nerves Optic (II)] : visual acuity intact bilaterally,  visual fields full to confrontation, pupils equal round and reactive to light [Cranial Nerves Facial (VII)] : face symmetrical [Cranial Nerves Vestibulocochlear (VIII)] : hearing was intact bilaterally [Cranial Nerves Glossopharyngeal (IX)] : tongue and palate midline [Cranial Nerves Accessory (XI - Cranial And Spinal)] : head turning and shoulder shrug symmetric [Cranial Nerves Hypoglossal (XII)] : there was no tongue deviation with protrusion [Motor Strength] : muscle strength was normal in all four extremities [Motor Handedness Right-Handed] : the patient is right hand dominant [1+] : Ankle jerk left 1+ [Plantar Reflex Right Only] : normal on the right [Plantar Reflex Left Only] : normal on the left [FreeTextEntry1] : He did have decreased blink rate and mildly decreased facial expression. Voice was normal. Extraocular movements were intact with normal saccades, smooth pursuit, and no square wave jerks seen. \par Tone was minimally increased of the neck and increased at the left upper extremity. Tone was otherwise normal. Rapid alternating movements were decreased in amplitude and speed at the left upper extremity. Foot tapping was normal bilaterally. \par \par He easily got up from the chair without using his arms. Gait was normal based and steady with good heel to toe stride and decreased left arm swing. Pull test was negative.  He had no apraxia. [FreeTextEntry7] : Decreased vibratory sense in feet. SCOTT ok.

## 2021-05-27 ENCOUNTER — APPOINTMENT (OUTPATIENT)
Dept: PULMONOLOGY | Facility: CLINIC | Age: 76
End: 2021-05-27
Payer: MEDICARE

## 2021-05-27 ENCOUNTER — NON-APPOINTMENT (OUTPATIENT)
Age: 76
End: 2021-05-27

## 2021-05-27 VITALS
HEART RATE: 71 BPM | BODY MASS INDEX: 27.77 KG/M2 | HEIGHT: 72 IN | WEIGHT: 205 LBS | OXYGEN SATURATION: 98 % | SYSTOLIC BLOOD PRESSURE: 110 MMHG | TEMPERATURE: 97.6 F | DIASTOLIC BLOOD PRESSURE: 80 MMHG | RESPIRATION RATE: 16 BRPM

## 2021-05-27 PROCEDURE — 99214 OFFICE O/P EST MOD 30 MIN: CPT | Mod: 25

## 2021-05-27 PROCEDURE — 94010 BREATHING CAPACITY TEST: CPT

## 2021-05-27 RX ORDER — FAMOTIDINE 40 MG/1
40 TABLET, FILM COATED ORAL
Qty: 1 | Refills: 3 | Status: ACTIVE | OUTPATIENT
Start: 2021-05-27

## 2021-05-27 NOTE — ASSESSMENT
[FreeTextEntry1] : Mr. Urbano is a 75 year old male presenting to the office for an acute care visit. He has medical history of chronic fatigue, GERD, low vitamin D, mild RADs, postnasal drip, and snoring. His chief concern is cough.\par \par 1. Cough:\par - likely r/t GERD. \par - ?RADs flare\par \par 2. RADs:\par - If cough continues - add Albuterol 2 puffs Q4-6H PRN. \par \par 3. GERD:\par - Continue follow up w/ Dr. Simon GI MD.\par - Continue Pantoprazole 40 mg PO QD.\par - Add Famotidine 40 mg PO @ bedtime ([facility] Print[facility]). \par  \par Patient to follow up with Dr. Gould for visit and PFTs in 4-6 months. \par Patient to call with further questions and concerns.\par Patient verbalizes understanding of care plan and is agreeable.\par

## 2021-05-27 NOTE — REVIEW OF SYSTEMS
[Cough] : cough [Sputum] : sputum [GERD] : gerd [Negative] : Allergy/Immunology [Chest Tightness] : no chest tightness [Dyspnea] : no dyspnea [Wheezing] : no wheezing [SOB on Exertion] : no sob on exertion [Nausea] : no nausea [Vomiting] : no vomiting [Diarrhea] : no diarrhea

## 2021-05-27 NOTE — HISTORY OF PRESENT ILLNESS
[TextBox_4] : Mr. Urbano is a 75 year old male presenting to the office for an acute care visit. He has medical history of chronic fatigue, GERD, low vitamin D, mild RADs, postnasal drip, and snoring. \par \par His chief concern is cough.\par \par Patient states cough worsening post meals and at night, but can occur intermittently through out the day.  Patient states he awakens with morning congestion and will cough up mucus.  Patient admits to frequent throat clearing through out the day.  He notes he is on PPI therapy for his reflux.   \par \par Patient denies fever/chills, decreased appetite, fatigue, SOB @ rest or exertion, chest tightness, wheezing, or any other issues at this time.

## 2021-05-27 NOTE — PROCEDURE
[FreeTextEntry1] : SPI performed in office show obstruction in smaller airways c/w diagnosis of RADs. \par FEV1: 97%\par FEV1/FVC Ratio: 91%\par [id number]%: 59% \par \par \par

## 2021-07-01 ENCOUNTER — APPOINTMENT (OUTPATIENT)
Dept: NEUROLOGY | Facility: CLINIC | Age: 76
End: 2021-07-01
Payer: MEDICARE

## 2021-07-01 VITALS
HEART RATE: 74 BPM | HEIGHT: 72 IN | BODY MASS INDEX: 27.5 KG/M2 | SYSTOLIC BLOOD PRESSURE: 110 MMHG | WEIGHT: 203 LBS | DIASTOLIC BLOOD PRESSURE: 77 MMHG

## 2021-07-01 PROCEDURE — 99214 OFFICE O/P EST MOD 30 MIN: CPT | Mod: 25

## 2021-07-01 PROCEDURE — 64611 CHEMODENERV SALIV GLANDS: CPT

## 2021-07-01 NOTE — PHYSICAL EXAM
[Person] : oriented to person [Place] : oriented to place [Time] : oriented to time [Naming Objects] : no difficulty naming common objects [Fluency] : fluency intact [Reading] : reading intact [Cranial Nerves Optic (II)] : visual acuity intact bilaterally,  visual fields full to confrontation, pupils equal round and reactive to light [Cranial Nerves Oculomotor (III)] : extraocular motion intact [Cranial Nerves Facial (VII)] : face symmetrical [Cranial Nerves Vestibulocochlear (VIII)] : hearing was intact bilaterally [Cranial Nerves Glossopharyngeal (IX)] : tongue and palate midline [Cranial Nerves Accessory (XI - Cranial And Spinal)] : head turning and shoulder shrug symmetric [Cranial Nerves Hypoglossal (XII)] : there was no tongue deviation with protrusion [Motor Strength] : muscle strength was normal in all four extremities [Motor Handedness Right-Handed] : the patient is right hand dominant [1+] : Ankle jerk left 1+ [Plantar Reflex Right Only] : normal on the right [Plantar Reflex Left Only] : normal on the left [FreeTextEntry1] : He did have decreased blink rate and mildly decreased facial expression. Voice was normal. Extraocular movements were intact with normal saccades, smooth pursuit, and no square wave jerks seen. \par Tone was minimally increased of the neck and moderately increased at the left upper extremity. Tone was otherwise normal. Rapid alternating movements were decreased in amplitude and speed at the left upper extremity. Foot tapping was normal bilaterally. \par \par He easily got up from the chair without using his arms. Gait was normal based and steady with good heel to toe stride and decreased left arm swing. Pull test was negative.  He had no apraxia. [FreeTextEntry7] : Decreased vibratory sense in feet. SCOTT ok.

## 2021-07-01 NOTE — HISTORY OF PRESENT ILLNESS
[FreeTextEntry1] : The patient is a 75-year-old right-handed man who comes for a second opinion for parkinsonism (previously seen by Dr. Delgadillo). 2018 he developed dexterity of the left hand and was referred to Dr. Delgadillo by his primary care doctor. Workup included MRI of the brain which showed mild microvascular disease, as well as the cervical spine showed multilevel disc disease. On November 9, 2018 he had a Fidel scan which showed reduced tracer uptake on the right. Because of the Fidel scan, he was started on rasagiline, initially 0.5 mg, then increase to 1 mg, which helped his drooling, but his sister is concerned it made him sleepier.\par There have been no changes in the facial expression, but his voice is hoarse her. He does have some drooling during the day. She has no dysphagia. He is mild trouble turning over in bed, which he attributes to a right rotator cuff tear. His handwriting is unchanged, and he is now told activities of daily living or work. He has noticed mild dragging of the left foot on a treadmill, which he does daily. Otherwise his gait is unaffected, and he has not fallen. He has no tremor. \par He has no history of acting out his dreams, but does snore. He is slightly cranky are than usual, but has no depression or anxiety. His memory is good without hallucinations. He has no constipation, changes in smell, urinary issues, or orthostasis. There is no family history of Parkinson's disease or other movement disorders. He was exposed to Agent Orange in [place].\par He has no other neurologic complaints, including no diplopia, numbness, or weakness.\par \par 1. When he stopped rasagiline, eventually had some balance and drooling issues, went back on it, Speech getting lower. Walking stable, no falls. mild foot drad. ADLs ok. Left hand is getting a little slower. \par 2. [facility], eligible for benefits re: agent orange, just got more rasagiline. [facility] will start speech therapy, exercises on his own (treadmill).\par 3. He has no history of acting out his dreams, but does snore. \par 4. He is slightly cranky are than usual, but has no depression or anxiety. His memory is good, no hallucinations. He has no constipation, changes in smell, urinary issues, or orthostasis. Developed some blurry vision, according to ophthalmologist has dry eye, discussed laser or tear duct. \par 5. Btx did not work well for sialorrhea, myobloc seemed better per his wife

## 2021-07-06 ENCOUNTER — APPOINTMENT (OUTPATIENT)
Dept: NEUROLOGY | Facility: CLINIC | Age: 76
End: 2021-07-06

## 2021-07-09 ENCOUNTER — TRANSCRIPTION ENCOUNTER (OUTPATIENT)
Age: 76
End: 2021-07-09

## 2021-08-24 ENCOUNTER — APPOINTMENT (OUTPATIENT)
Dept: NEUROLOGY | Facility: CLINIC | Age: 76
End: 2021-08-24

## 2021-09-30 ENCOUNTER — APPOINTMENT (OUTPATIENT)
Dept: PULMONOLOGY | Facility: CLINIC | Age: 76
End: 2021-09-30
Payer: MEDICARE

## 2021-09-30 VITALS
BODY MASS INDEX: 27.77 KG/M2 | HEIGHT: 72 IN | TEMPERATURE: 96.6 F | RESPIRATION RATE: 16 BRPM | OXYGEN SATURATION: 98 % | HEART RATE: 78 BPM | DIASTOLIC BLOOD PRESSURE: 80 MMHG | WEIGHT: 205 LBS | SYSTOLIC BLOOD PRESSURE: 118 MMHG

## 2021-09-30 DIAGNOSIS — R53.82 CHRONIC FATIGUE, UNSPECIFIED: ICD-10-CM

## 2021-09-30 PROCEDURE — 99214 OFFICE O/P EST MOD 30 MIN: CPT

## 2021-09-30 NOTE — PROCEDURE
[FreeTextEntry1] : 6 minute walk test reveals a low saturation of 94% with no evidence of dyspnea or fatigue; walked  374.9 meters

## 2021-09-30 NOTE — PHYSICAL EXAM
[No Acute Distress] : no acute distress [Well Nourished] : well nourished [Well Groomed] : well groomed [No Deformities] : no deformities [Well Developed] : well developed [II] : Mallampati Class: II [Normal Oropharynx] : normal oropharynx [Normal Appearance] : normal appearance [No Neck Mass] : no neck mass [Normal Rate/Rhythm] : normal rate/rhythm [Normal S1, S2] : normal s1, s2 [No Murmurs] : no murmurs [No Resp Distress] : no resp distress [Clear to Auscultation Bilaterally] : clear to auscultation bilaterally [No Abnormalities] : no abnormalities [Benign] : benign [Normal Gait] : normal gait [No Clubbing] : no clubbing [No Cyanosis] : no cyanosis [No Edema] : no edema [FROM] : FROM [Normal Color/ Pigmentation] : normal color/ pigmentation [No Focal Deficits] : no focal deficits [Oriented x3] : oriented x3 [Normal Affect] : normal affect [TextBox_2] : Appears Well.  [TextBox_68] : I:E 1:3, clear

## 2021-09-30 NOTE — ADDENDUM
[FreeTextEntry1] : Documented by Jessie Durham acting as a scribe for Dr. Joel Gould on 09/30/2021 \par \par All medical record entries made by the Scribe were at my, Dr. Joel Gould's, direction and personally dictated by me on 09/30/2021 . I have reviewed the chart and agree that the record accurately reflects my personal performance of the history, physical exam, assessment and plan. I have also personally directed, reviewed, and agree with the discharge instructions.

## 2021-09-30 NOTE — HISTORY OF PRESENT ILLNESS
[FreeTextEntry1] : Mr. Urbano is a 75 year old male who present to the office for a follow up visit for chronic fatigue, GERD, low vitamin D, mild RADs, postnasal drip, and snoring. His chief complaint is\par - he has been feeling good\par - he notes he has gained about 2 lbs \par - no chest pains / pressure\par - no difficulty swallowing\par - he sometimes has itchy ears. \par - he has been doing well with the weather, his allergies have not been bothering him. \par - he notes he has been doing balance exercises\par - no SOB, nor on back or middle of the night \par - he notes he has been getting about 8 hours of sleep a night \par - he notes he was vaccinated back in February 2021 \par - He  denies any visual issues, headaches, nausea, vomiting, fever, chills, sweats, chest pains, chest pressure, diarrhea, constipation, dysphagia, myalgia, dizziness, leg swelling, leg pain, itchy eyes, itchy ears, heartburn, reflux, or sour taste in the mouth.\par

## 2021-09-30 NOTE — ASSESSMENT
[FreeTextEntry1] : Mr. rUbano is 75 year old Male who has a history of recent Parkinson's, RADs, allergies, GERD, and low vitamin D.  He is doing well from a pulmonary perspective and is exercising frequently 3-5 days per week - #1 issue is Prostate. - stable \par \par problem 1: RADs (controlled)\par -quiet at this point in time\par -on the shelf is Dulera 200 at 2 inhalations BID\par \par -Inhaler technique reviewed as well as oral hygiene techniques reviewed with patient. Avoidance of cold air, extremes of temperature, rescue inhaler should be used before exercise. Order of medication reviewed with patient. Recommended use of a cool mist humidifier in the bedroom.\par \par problem 2: post nasal drip syndrome\par -continue to use Xlear or Arm and Hammer nasal saline\par \par -Environmental measures for allergies were encouraged including mattress and pillow cover, air purifier, and environmental controls.\par \par problem 3: GERD\par -continue Nexium 20mg every morning \par -continue Pepcid PRN\par -discussed Rule of 2's; pt should avoid eating too much; too fast; too spicy; too lousy; less than two hours before bed \par -Things to avoid including overeating, spicy foods, tight clothing, eating within three hours of bed, this list is not all inclusive. \par -For treatment of reflux, possible options discussed including diet control, H2 blockers, PPIs, as well as coating motility agents discussed as treatment options. Timing of meals and proximity of last meal to sleep were discussed. If symptoms persist, a formal gastrointestinal evaluation is needed. \par \par problem 4: overweight (in progress)\par -Weight loss, exercise, and diet control were discussed and are highly encouraged. Treatment options were given such as, aqua therapy, and contacting a nutritionist. Recommended to use the elliptical, stationary bike, less use of treadmill.  Obesity is associated with worsening asthma, shortness of breath, and potential for cardiac disease, diabetes, and other underlying medical conditions.\par \par problem 5: low vitamin D\par -continue vitamin D therapy\par \par -Has been associated with asthma exacerbations and increased allergic symptoms. The goal based on recent information is maintaining levels between 50-70 and low normal is 30. Recommended 50,000 units every two weeks to once a month depending on the level. \par \par problem 6: chronic fatigue, snoring, r/o sleep apnea\par -pt is being sent for blood work: thyroid function test (normal) , free and total testosterone level (low)\par -based on fatigue, snoring pt is being set up for a home sleep study (refused)\par \par -Sleep apnea is associated with adverse clinical consequences which an affect most organ systems. Cardiovascular disease risk includes arrhythmias, atrial fibrillation, hypertension, coronary artery disease, and stroke. Metabolic disorders include diabetes type 2, non-alcoholic fatty liver disease. Mood disorder especially depression; and cognitive decline especially in the elderly. Associations with chronic reflux/Shane’s esophagus some but not all inclusive.\par -Reasons include arousal consistent with hypopnea; respiratory events most prominent in REM sleep or supine position; therefore sleep staging and body position are important for accurate diagnosis and estimation of AHI.\par -aside from OSAS other etiologies include thyroid disease, anemia, low testosterone levels,\par and vitamin deficiencies as well as medication effects. Based on this recommended: CBC, thyroid function test, vitamin D levels, sleep study, and free and total testosterone levels. Dr. Gould went over topic multiple times and discussed for an extensive period of time. \par \par problem 7: low testosterone\par -continue Boron supplement 6mg QD\par \par problem 8: low vitamin D \par -add vitamin D supplement \par Has been associated with asthma exacerbations and increased allergic symptoms. The goal based on recent information is maintaining levels between 50-70 and low normal is 30. Recommended 50,000 units every two weeks to once a month depending on the level \par \par Problem 9: poor balance - Parkinsons ([place])\par - Prescription given for balance therapy and gait training\par \par problem 10: health maintenance \par - s/p COVID-19 2/2021\par - evaluation - Dr. Madrigal\par -recommended Juan Luis Chi / yoga\par -s/p 2019 influenza vaccination\par -recommended to increase hydration\par -recommended to try MouthKote\par -recommended strep pneumonia vaccines: Prevnar-13 vaccine (s/p), followed by Pneumo vaccine 23 (pending) one year following\par -recommended early intervention for URIs\par -recommended regular osteoporosis evaluations\par -recommended early dermatological evaluations\par -recommended after the age of 50 to the age of 70, colonoscopy every 5 years \par  \par \par F/U in 6 months\par He is encouraged to call with any changes, concerns, or questions.

## 2021-10-05 ENCOUNTER — APPOINTMENT (OUTPATIENT)
Dept: NEUROLOGY | Facility: CLINIC | Age: 76
End: 2021-10-05
Payer: MEDICARE

## 2021-10-05 PROCEDURE — 99214 OFFICE O/P EST MOD 30 MIN: CPT | Mod: 25

## 2021-10-05 PROCEDURE — 64611 CHEMODENERV SALIV GLANDS: CPT

## 2021-10-05 NOTE — PROCEDURE
[FreeTextEntry1] : The patient received injections with botulinum toxin B (Myobloc), diluted at 500 units/0.1 cc via a 30 ga needle into the following sites, in the usual antiseptic manner. Possible side effects including over-weakening, dysphagia, bleeding, and local infection were discussed.\par \par 1. Left parotid gland 1000/2\par 2. Right parotid gland 1000/2\par \par Total injected 2000 units, waste 500 units, total used 2500 units \par The patient tolerated the procedure well, with not complications\par

## 2021-10-05 NOTE — HISTORY OF PRESENT ILLNESS
[FreeTextEntry1] : The patient is a 75-year-old right-handed man who comes for a second opinion for parkinsonism (previously seen by Dr. Delgadillo). 2018 he developed dexterity of the left hand and was referred to Dr. Delgadillo by his primary care doctor. Workup included MRI of the brain which showed mild microvascular disease, as well as the cervical spine showed multilevel disc disease. On November 9, 2018 he had a Fidel scan which showed reduced tracer uptake on the right. Because of the Fidel scan, he was started on rasagiline, initially 0.5 mg, then increase to 1 mg, which helped his drooling, but his sister is concerned it made him sleepier.\par There have been no changes in the facial expression, but his voice is hoarse her. He does have some drooling during the day. She has no dysphagia. He is mild trouble turning over in bed, which he attributes to a right rotator cuff tear. His handwriting is unchanged, and he is now told activities of daily living or work. He has noticed mild dragging of the left foot on a treadmill, which he does daily. Otherwise his gait is unaffected, and he has not fallen. He has no tremor. \par He has no history of acting out his dreams, but does snore. He is slightly cranky are than usual, but has no depression or anxiety. His memory is good without hallucinations. He has no constipation, changes in smell, urinary issues, or orthostasis. There is no family history of Parkinson's disease or other movement disorders. He was exposed to Agent Orange in [place].\par He has no other neurologic complaints, including no diplopia, numbness, or weakness.\par \par 1. Currently he is taking Rasagiline 1 mg once a day. When he stopped rasagiline, eventually had some balance and drooling issues, went back on it, Speech getting lower. Walking stable, no falls. mild foot drag. ADLs ok. Left hand is getting a little slower. \par 2. [facility], eligible for benefits re: agent orange, just got more rasagiline. [facility] will start speech therapy, exercises on his own (treadmill).\par 3. He has no history of acting out his dreams, but does snore. \par 4. He is slightly cranky are than usual, but has no depression or anxiety. His memory is good, no hallucinations. He has no constipation, changes in smell, urinary issues, or orthostasis. Developed some blurry vision, according to ophthalmologist has dry eye, discussed laser or tear duct. \par 5. Btx did not work well for sialorrhea, myobloc seemed better per his wife. \par 6. Asks about dry skin, he will see his PCP in few days. Asks if he can take 3rd COVID-19 dose, he is scheduled for it in January 2022. Concerned about shingles recurrence, time of his shingle shot is coming up. Also asks if he can take his flu shot this year. He will discuss to PCP and will make arrangement. \par

## 2021-10-05 NOTE — PHYSICAL EXAM
[Person] : oriented to person [Place] : oriented to place [Time] : oriented to time [Naming Objects] : no difficulty naming common objects [Fluency] : fluency intact [Reading] : reading intact [Cranial Nerves Optic (II)] : visual acuity intact bilaterally,  visual fields full to confrontation, pupils equal round and reactive to light [Cranial Nerves Oculomotor (III)] : extraocular motion intact [Cranial Nerves Facial (VII)] : face symmetrical [Cranial Nerves Vestibulocochlear (VIII)] : hearing was intact bilaterally [Cranial Nerves Glossopharyngeal (IX)] : tongue and palate midline [Cranial Nerves Accessory (XI - Cranial And Spinal)] : head turning and shoulder shrug symmetric [Cranial Nerves Hypoglossal (XII)] : there was no tongue deviation with protrusion [Motor Strength] : muscle strength was normal in all four extremities [Motor Handedness Right-Handed] : the patient is right hand dominant [1+] : Ankle jerk right 1+ [Plantar Reflex Right Only] : normal on the right [Plantar Reflex Left Only] : normal on the left [FreeTextEntry1] : He did have decreased blink rate and mildly decreased facial expression. Voice was normal. Extraocular movements were intact with normal saccades, smooth pursuit, and no square wave jerks seen. \par Tone was minimally increased of the neck and moderately increased at the left upper extremity. Tone was otherwise normal. Rapid alternating movements were decreased in amplitude and speed at the left upper extremity. Foot tapping was normal bilaterally. \par \par He easily got up from the chair without using his arms. Gait was normal based and steady with good heel to toe stride and decreased left arm swing. Pull test was negative.  He had no apraxia. [FreeTextEntry7] : Decreased vibratory sense in feet. SCOTT ok.

## 2021-10-05 NOTE — DISCUSSION/SUMMARY
[FreeTextEntry1] : In summary, the patient is a 75-year-old right-handed man with less than a year history of decreased dexterity in the left arm. Examination was significant for left sided rigidity and slowing, and mild hypomimia. A Fidel scan showed decreased uptake on the right. Overall, the history and examination is indeed probably most consistent with idiopathic Parkinson's disease. Given the strict asymmetry, I cannot rule out an atypical parkinsonian disorder at this time, though there were no findings and examination that would be specific for one. He was exposed to Agent Orange in the amount which does pose a risk factor for Parkinson's disease, and he is in the process of applying for [facility] benefits.\par \par As for treatment, he's currently taking rasagiline, and is not sure that even is helping him. We discussed the lack of clear data on disease modification, and he is concerned about the cost of the medication. He is aware of the left slowing but not limited\par \par 1. Continue rasagiline. He is fully independent in his ADLs, no need for stronger meds. We discussed that the decision is mostly about him. Per [facility], [facility] Sh[facility] require letter of medical necessity\par 2. Speech therapy. Sialorrhea: Myobloc per procedure note, mentioned above\par 3. Exercise as he is doing, rock-steady boxing. Speech therapy\par 4. Follow-up in 3 months \par \par We discussed the above impression, plan and recommendations during the visit. Counseling represented more then 50% of the 30 minute visit time\par \par \par

## 2022-01-04 ENCOUNTER — APPOINTMENT (OUTPATIENT)
Dept: NEUROLOGY | Facility: CLINIC | Age: 77
End: 2022-01-04
Payer: MEDICARE

## 2022-01-04 PROCEDURE — 99214 OFFICE O/P EST MOD 30 MIN: CPT | Mod: 25

## 2022-01-04 PROCEDURE — 64611 CHEMODENERV SALIV GLANDS: CPT

## 2022-01-04 NOTE — PHYSICAL EXAM
[Person] : oriented to person [Place] : oriented to place [Time] : oriented to time [Naming Objects] : no difficulty naming common objects [Fluency] : fluency intact [Reading] : reading intact [Cranial Nerves Optic (II)] : visual acuity intact bilaterally,  visual fields full to confrontation, pupils equal round and reactive to light [Cranial Nerves Oculomotor (III)] : extraocular motion intact [Cranial Nerves Facial (VII)] : face symmetrical [Cranial Nerves Vestibulocochlear (VIII)] : hearing was intact bilaterally [Cranial Nerves Glossopharyngeal (IX)] : tongue and palate midline [Cranial Nerves Accessory (XI - Cranial And Spinal)] : head turning and shoulder shrug symmetric [Cranial Nerves Hypoglossal (XII)] : there was no tongue deviation with protrusion [Motor Strength] : muscle strength was normal in all four extremities [Motor Handedness Right-Handed] : the patient is right hand dominant [1+] : Ankle jerk left 1+ [Plantar Reflex Right Only] : normal on the right [Plantar Reflex Left Only] : normal on the left [FreeTextEntry1] : He did have decreased blink rate and mildly decreased facial expression. Voice was normal. Extraocular movements were intact with normal saccades, smooth pursuit, and no square wave jerks seen. \par \par Tone was minimally increased of the neck and moderately increased at the left upper extremity. Tone was otherwise normal. Rapid alternating movements were decreased in amplitude and speed at the left upper extremity. Foot tapping was normal bilaterally. \par \par He easily got up from the chair without using his arms. Gait was normal based and steady with good heel to toe stride and decreased left arm swing. Pull test was negative.  He had no apraxia. [FreeTextEntry7] : Decreased vibratory sense in feet. SCOTT ok.

## 2022-01-04 NOTE — DISCUSSION/SUMMARY
[FreeTextEntry1] : In summary, the patient is a 76-year-old right-handed man with less than a year history of decreased dexterity in the left arm. Examination was significant for left sided rigidity and slowing, and mild hypomimia. A Fidel scan showed decreased uptake on the right. Overall, the history and examination is indeed probably most consistent with idiopathic Parkinson's disease. Given the strict asymmetry, I cannot rule out an atypical parkinsonian disorder at this time, though there were no findings and examination that would be specific for one. He was exposed to Agent Orange in the amount which does pose a risk factor for Parkinson's disease, and he is in the process of applying for [facility] benefits.\par \par As for treatment, he's currently taking rasagiline, and is not sure that even is helping him. We discussed the lack of clear data on disease modification, and he is concerned about the cost of the medication. He is aware of the left slowing but not limited\par \par 1. Continue rasagiline. He is fully independent in his ADLs, no need for stronger meds. We discussed that the decision is mostly about him. Per [facility], [facility] require letter of medical necessity\par 2. Speech therapy. Sialorrhea: Myobloc per procedure note\par 3. Exercise as he is doing, rock-steady boxing. Speech therapy\par 4. Follow-up in 3 months \par \par We discussed the above impression, plan and recommendations during the visit. Counseling represented more then 50% of the 30 minute visit time\par \par \par

## 2022-01-04 NOTE — HISTORY OF PRESENT ILLNESS
[FreeTextEntry1] : The patient is a 76-year-old right-handed man who comes for a second opinion for parkinsonism (previously seen by Dr. Delgadillo). 2018 he developed dexterity of the left hand and was referred to Dr. Delgadillo by his primary care doctor. Workup included MRI of the brain which showed mild microvascular disease, as well as the cervical spine showed multilevel disc disease. On November 9, 2018 he had a Fidel scan which showed reduced tracer uptake on the right. Because of the Fidel scan, he was started on rasagiline, initially 0.5 mg, then increase to 1 mg, which helped his drooling, but his sister is concerned it made him sleepier.\par There have been no changes in the facial expression, but his voice is hoarse her. He does have some drooling during the day. She has no dysphagia. He is mild trouble turning over in bed, which he attributes to a right rotator cuff tear. His handwriting is unchanged, and he is now told activities of daily living or work. He has noticed mild dragging of the left foot on a treadmill, which he does daily. Otherwise his gait is unaffected, and he has not fallen. He has no tremor. \par He has no history of acting out his dreams, but does snore. He is slightly cranky are than usual, but has no depression or anxiety. His memory is good without hallucinations. He has no constipation, changes in smell, urinary issues, or orthostasis. There is no family history of Parkinson's disease or other movement disorders. He was exposed to Agent Orange in [place].\par He has no other neurologic complaints, including no diplopia, numbness, or weakness.\par \par 1. Currently he is taking Rasagiline 1 mg once a day. When he stopped rasagiline, eventually had some balance and drooling issues, went back on it, Speech getting lower. Walking stable, no falls. mild foot drag. ADLs ok. Left hand is getting a little slower. \par 2. [facility] with [facility], eligible for benefits re: agent orange, just got more rasagiline. [facility] gave speech therapy, exercises on his own (treadmill).\par 3. He has no history of acting out his dreams, but does snore. \par 4. He is slightly cranky are than usual, but has no depression or anxiety. His memory is good, no hallucinations. He has no constipation, changes in smell, urinary issues, or orthostasis. Developed some blurry vision, according to ophthalmologist has dry eye, discussed laser or tear duct. \par 5. Btx did not work well for sialorrhea, myobloc better per his wife. \par \par

## 2022-01-04 NOTE — PROCEDURE
[FreeTextEntry1] : The patient received injections with botulinum toxin B (Myobloc), diluted at 500 units/0.1 cc via a 30 ga needle into the following sites, in the usual antiseptic manner. Possible side effects including over-weakening, dysphagia, bleeding, and local infection were discussed.\par \par 1. Left parotid gland 1250/2\par 2. Right parotid gland 1250/2\par \par Total injected 2500 units, waste 0 units, total used 2500 units \par The patient tolerated the procedure well, with not complications\par

## 2022-04-05 ENCOUNTER — APPOINTMENT (OUTPATIENT)
Dept: NEUROLOGY | Facility: CLINIC | Age: 77
End: 2022-04-05
Payer: MEDICARE

## 2022-04-05 VITALS
DIASTOLIC BLOOD PRESSURE: 80 MMHG | BODY MASS INDEX: 27.36 KG/M2 | HEIGHT: 72 IN | WEIGHT: 202 LBS | HEART RATE: 78 BPM | SYSTOLIC BLOOD PRESSURE: 110 MMHG

## 2022-04-05 PROCEDURE — 64611 CHEMODENERV SALIV GLANDS: CPT

## 2022-04-05 PROCEDURE — 99214 OFFICE O/P EST MOD 30 MIN: CPT | Mod: 25

## 2022-04-05 NOTE — DISCUSSION/SUMMARY
[FreeTextEntry1] : In summary, the patient is a 76-year-old right-handed man with history of decreased dexterity in the left arm since 2018.  Examination was significant for left sided rigidity and slowing, and mild hypomimia. A Fidel scan showed decreased uptake on the right. Overall, the history and examination is indeed probably most consistent with idiopathic Parkinson's disease. Given the strict asymmetry, I cannot rule out an atypical parkinsonian disorder at this time, though there were no findings and examination that would be specific for one. He was exposed to Agent Orange in the amount which does pose a risk factor for Parkinson's disease.\par \par As for treatment, he's currently taking rasagiline, and is not sure that even is helping him. We discussed the lack of clear data on disease modification, and he is concerned about the cost of the medication. He is aware of the left slowing but not limited\par \par 1. Continue rasagiline. He is fully independent in his ADLs, no need for stronger medications though he is getting closer. We discussed that the decision is mostly about him. Per [facility], [facility] require letter of medical necessity, but LD would also be ok.  \par 2. Speech therapy. Sialorrhea: Myobloc per procedure note. He will monitor how much it is working. \par 3. Exercise as he is doing, rock-steady boxing. Speech therapy\par 4. Follow-up in 3 months \par \par We discussed the above impression, plan and recommendations during the visit. Counseling represented more then 50% of the 30 minute visit time

## 2022-04-05 NOTE — HISTORY OF PRESENT ILLNESS
[FreeTextEntry1] : The patient is a 76-year-old right-handed man who comes for a second opinion for parkinsonism (previously seen by Dr. Delgadillo). 2018 he developed dexterity of the left hand and was referred to Dr. Delgadillo by his primary care doctor. Workup included MRI of the brain which showed mild microvascular disease, as well as the cervical spine showed multilevel disc disease. On November 9, 2018 he had a Fidel scan which showed reduced tracer uptake on the right. Because of the Fidel scan, he was started on rasagiline, initially 0.5 mg, then increase to 1 mg, which helped his drooling, but his sister is concerned it made him sleepier.\par There have been no changes in the facial expression, but his voice is hoarse her. He does have some drooling during the day. She has no dysphagia. He is mild trouble turning over in bed, which he attributes to a right rotator cuff tear. His handwriting is unchanged, and he is now told activities of daily living or work. He has noticed mild dragging of the left foot on a treadmill, which he does daily. Otherwise his gait is unaffected, and he has not fallen. He has no tremor. \par He has no history of acting out his dreams, but does snore. He is slightly cranky are than usual, but has no depression or anxiety. His memory is good without hallucinations. He has no constipation, changes in smell, urinary issues, or orthostasis. There is no family history of Parkinson's disease or other movement disorders. He was exposed to Agent Orange in [place].\par He has no other neurologic complaints, including no diplopia, numbness, or weakness.\par \par 1. Currently he is taking Rasagiline 1 mg once a day. When he stopped rasagiline, eventually had some balance and drooling issues, went back on it, Speech getting lower. Walking stable, no falls. mild foot drag. ADLs ok. Left hand is getting a little slower. \par 2. [facility] with [facility], eligible for benefits re: agent orange, just got more rasagiline. [facility] gave speech therapy, exercises on his own (treadmill).\par 3. He has no history of acting out his dreams, but does snore. \par 4. He is slightly cranky are than usual, but has no depression or anxiety. His memory is good, no hallucinations. He has no constipation, changes in smell, urinary issues, or orthostasis. Developed some blurry vision, according to ophthalmologist has dry eye, discussed laser or tear duct. \par 5. Btx did not work well for sialorrhea, myobloc better per his wife. He is less sure.  \par \par

## 2022-04-05 NOTE — PHYSICAL EXAM
[Person] : oriented to person [Place] : oriented to place [Time] : oriented to time [Short Term Intact] : short term memory intact [Registration Intact] : recent registration memory intact [Naming Objects] : no difficulty naming common objects [Repeating Phrases] : no difficulty repeating a phrase [Fluency] : fluency intact [Comprehension] : comprehension intact [Reading] : reading intact [Cranial Nerves Optic (II)] : visual acuity intact bilaterally,  visual fields full to confrontation, pupils equal round and reactive to light [Cranial Nerves Oculomotor (III)] : extraocular motion intact [Cranial Nerves Facial (VII)] : face symmetrical [Cranial Nerves Vestibulocochlear (VIII)] : hearing was intact bilaterally [Cranial Nerves Glossopharyngeal (IX)] : tongue and palate midline [Cranial Nerves Accessory (XI - Cranial And Spinal)] : head turning and shoulder shrug symmetric [Cranial Nerves Hypoglossal (XII)] : there was no tongue deviation with protrusion [Motor Strength] : muscle strength was normal in all four extremities [Motor Handedness Right-Handed] : the patient is right hand dominant [1+] : Ankle jerk left 1+ [Plantar Reflex Right Only] : normal on the right [Plantar Reflex Left Only] : normal on the left [FreeTextEntry1] : He did have decreased blink rate and mildly decreased facial expression. Voice was normal. Extraocular movements were intact with normal saccades, smooth pursuit, and no square wave jerks seen. \par \par Tone was minimally increased at the neck and moderately increased at the left upper extremity. Tone was otherwise normal. Rapid alternating movements were decreased in amplitude and speed at the left upper extremity. Foot tapping was normal bilaterally. \par \par He easily got up from the chair without using his arms. Gait was normal based and steady with good heel to toe stride and decreased left arm swing. Pull test was negative.  He had no apraxia. [FreeTextEntry7] : Decreased vibratory sense in feet. SCOTT ok.

## 2022-05-04 ENCOUNTER — NON-APPOINTMENT (OUTPATIENT)
Age: 77
End: 2022-05-04

## 2022-05-04 ENCOUNTER — APPOINTMENT (OUTPATIENT)
Dept: PULMONOLOGY | Facility: CLINIC | Age: 77
End: 2022-05-04
Payer: MEDICARE

## 2022-05-04 VITALS
DIASTOLIC BLOOD PRESSURE: 60 MMHG | BODY MASS INDEX: 28.71 KG/M2 | RESPIRATION RATE: 16 BRPM | TEMPERATURE: 97.3 F | HEART RATE: 70 BPM | HEIGHT: 72 IN | SYSTOLIC BLOOD PRESSURE: 110 MMHG | OXYGEN SATURATION: 96 % | WEIGHT: 212 LBS

## 2022-05-04 DIAGNOSIS — R79.89 OTHER SPECIFIED ABNORMAL FINDINGS OF BLOOD CHEMISTRY: ICD-10-CM

## 2022-05-04 DIAGNOSIS — R26.89 OTHER ABNORMALITIES OF GAIT AND MOBILITY: ICD-10-CM

## 2022-05-04 DIAGNOSIS — R09.82 POSTNASAL DRIP: ICD-10-CM

## 2022-05-04 DIAGNOSIS — R06.83 SNORING: ICD-10-CM

## 2022-05-04 PROCEDURE — 95012 NITRIC OXIDE EXP GAS DETER: CPT

## 2022-05-04 PROCEDURE — 94010 BREATHING CAPACITY TEST: CPT

## 2022-05-04 PROCEDURE — 99214 OFFICE O/P EST MOD 30 MIN: CPT | Mod: 25

## 2022-05-04 NOTE — HISTORY OF PRESENT ILLNESS
[FreeTextEntry1] : Mr. Urbano is a 76 year old male who present to the office for a follow up visit for chronic fatigue, GERD, low vitamin D, mild RADs, postnasal drip, and snoring. His chief complaint is\par - he notes he has gained some weight ng \par - his energy level has been about 7/10 \par - no headaches \par - no chest pains / pressure \par - he notes since he had his colonoscopy 3 weeks ago, he has been feeling more constipated \par - he notes his sleep has been good, gets about 8 hours of sleep a night \par - he notes his prostate is enlarged and he is reluctant to take any type of medications for it \par - does not want to take anything that will lower his BP \par - he has not been drinking enough water \par - patient denies any headaches, nausea, vomiting, fever, chills, sweats, chest pain, chest pressure, palpitations, coughing, wheezing, fatigue, diarrhea, constipation, dysphagia, myalgias, dizziness, leg swelling, leg pain, itchy eyes, itchy ears, heartburn, reflux or sour taste in the mouth\par

## 2022-05-04 NOTE — PHYSICAL EXAM
[No Acute Distress] : no acute distress [Well Nourished] : well nourished [Well Groomed] : well groomed [No Deformities] : no deformities [Well Developed] : well developed [Normal Oropharynx] : normal oropharynx [II] : Mallampati Class: II [Normal Appearance] : normal appearance [No Neck Mass] : no neck mass [Normal Rate/Rhythm] : normal rate/rhythm [Normal S1, S2] : normal s1, s2 [No Murmurs] : no murmurs [No Resp Distress] : no resp distress [Clear to Auscultation Bilaterally] : clear to auscultation bilaterally [No Abnormalities] : no abnormalities [Benign] : benign [Normal Gait] : normal gait [No Clubbing] : no clubbing [No Cyanosis] : no cyanosis [No Edema] : no edema [FROM] : FROM [Normal Color/ Pigmentation] : normal color/ pigmentation [No Focal Deficits] : no focal deficits [Oriented x3] : oriented x3 [Normal Affect] : normal affect [TextBox_2] : Appears Well.  [TextBox_68] : I:E 1:3, clear

## 2022-05-04 NOTE — ASSESSMENT
[FreeTextEntry1] : Mr. Urbano is 76 year old Male who has a history of recent Parkinson's, RADs, allergies, GERD, and low vitamin D.  He is doing well from a pulmonary perspective and is exercising frequently 3-5 days per week - #1 issue is constipation \par \par problem 1: RADs (controlled)\par -quiet at this point in time\par -on the shelf is Dulera 200 at 2 inhalations BID\par \par -Inhaler technique reviewed as well as oral hygiene techniques reviewed with patient. Avoidance of cold air, extremes of temperature, rescue inhaler should be used before exercise. Order of medication reviewed with patient. Recommended use of a cool mist humidifier in the bedroom.\par \par problem 2: post nasal drip syndrome\par -continue to use Xlear or Arm and Hammer nasal saline\par \par -Environmental measures for allergies were encouraged including mattress and pillow cover, air purifier, and environmental controls.\par \par problem 3: GERD\par -continue Nexium 20mg every morning \par -continue Pepcid PRN\par -discussed Rule of 2's; pt should avoid eating too much; too fast; too spicy; too lousy; less than two hours before bed \par -Things to avoid including overeating, spicy foods, tight clothing, eating within three hours of bed, this list is not all inclusive. \par -For treatment of reflux, possible options discussed including diet control, H2 blockers, PPIs, as well as coating motility agents discussed as treatment options. Timing of meals and proximity of last meal to sleep were discussed. If symptoms persist, a formal gastrointestinal evaluation is needed. \par \par Problem 3A: Constipation \par - recommended  to clear out colon \par problem 4: overweight (in progress)\par -Weight loss, exercise, and diet control were discussed and are highly encouraged. Treatment options were given such as, aqua therapy, and contacting a nutritionist. Recommended to use the elliptical, stationary bike, less use of treadmill.  Obesity is associated with worsening asthma, shortness of breath, and potential for cardiac disease, diabetes, and other underlying medical conditions.\par \par problem 5: low vitamin D\par -continue vitamin D therapy\par \par -Has been associated with asthma exacerbations and increased allergic symptoms. The goal based on recent information is maintaining levels between 50-70 and low normal is 30. Recommended 50,000 units every two weeks to once a month depending on the level. \par \par problem 6: chronic fatigue, snoring, r/o sleep apnea\par -pt is being sent for blood work: thyroid function test (normal) , free and total testosterone level (low)\par -based on fatigue, snoring pt is being set up for a home sleep study (refused)\par \par -Sleep apnea is associated with adverse clinical consequences which an affect most organ systems. Cardiovascular disease risk includes arrhythmias, atrial fibrillation, hypertension, coronary artery disease, and stroke. Metabolic disorders include diabetes type 2, non-alcoholic fatty liver disease. Mood disorder especially depression; and cognitive decline especially in the elderly. Associations with chronic reflux/Shane’s esophagus some but not all inclusive.\par -Reasons include arousal consistent with hypopnea; respiratory events most prominent in REM sleep or supine position; therefore sleep staging and body position are important for accurate diagnosis and estimation of AHI.\par -aside from OSAS other etiologies include thyroid disease, anemia, low testosterone levels,\par and vitamin deficiencies as well as medication effects. Based on this recommended: CBC, thyroid function test, vitamin D levels, sleep study, and free and total testosterone levels. Dr. Gould went over topic multiple times and discussed for an extensive period of time. \par \par problem 7: low testosterone\par -continue Boron supplement 6mg QD\par \par problem 8: low vitamin D \par -add vitamin D supplement \par Has been associated with asthma exacerbations and increased allergic symptoms. The goal based on recent information is maintaining levels between 50-70 and low normal is 30. Recommended 50,000 units every two weeks to once a month depending on the level \par \par Problem 9: poor balance - Parkinsons ([place])\par - Prescription given for balance therapy and gait training\par \par problem 10: health maintenance \par - Recommended [employer] \par - s/p COVID-19 2/2021\par - evaluation - Dr. Madrigal\par -recommended Juan Luis Chi / yoga\par - s/p flu shot - 2021 \par -recommended to increase hydration\par -recommended to try MouthKote\par -recommended strep pneumonia vaccines: Prevnar-13 vaccine (s/p), followed by Pneumo vaccine 23 (pending) one year following\par -recommended early intervention for URIs\par -recommended regular osteoporosis evaluations\par -recommended early dermatological evaluations\par -recommended after the age of 50 to the age of 70, colonoscopy every 5 years \par  \par \par F/U in 6 months\par He is encouraged to call with any changes, concerns, or questions.

## 2022-05-04 NOTE — ADDENDUM
[FreeTextEntry1] : Documented by Jessie Durham acting as a scribe for Dr. Joel Gould on (05/04/2022).\par \par All medical record entries made by the Scribe were at my, Dr. Joel Gould's, direction and personally dictated by me on (05/04/2022). I have reviewed the chart and agree that the record accurately reflects my personal performance of the history, physical exam, assessment and plan. I have also personally directed, reviewed, and agree with the discharge instructions.\par

## 2022-05-04 NOTE — PROCEDURE
[FreeTextEntry1] : PFT revealed normal flows, with a FEV1 of 3.22 L, which is 97% of predicted, with a normal flow volume loop\par  \par Feno was 12 ; a normal value being less than 25. Fractional exhaled nitric oxide (FENO) is regarded as a simple, noninvasive method for assessing eosinophilic airway inflammation. Produced by a variety of cells within the lung, nitric oxide (NO) concentrations are generally low in healthy individuals. However, high concentrations of NO appear to be involved in nonspecific host defense mechanisms and chronic inflammatory  diseases such as asthma. The American Thoracic Society (ATS) therefore recommended using FENO to aid in the diagnosis and monitoring of eosinophilic airway inflammation and asthma, and for identifying steroid responsive individuals whose chronic respiratory symptoms may be caused by airway inflammation \par \par

## 2022-07-05 ENCOUNTER — APPOINTMENT (OUTPATIENT)
Dept: NEUROLOGY | Facility: CLINIC | Age: 77
End: 2022-07-05

## 2022-07-05 VITALS
HEIGHT: 72 IN | WEIGHT: 206 LBS | DIASTOLIC BLOOD PRESSURE: 75 MMHG | BODY MASS INDEX: 27.9 KG/M2 | SYSTOLIC BLOOD PRESSURE: 116 MMHG | HEART RATE: 91 BPM

## 2022-07-05 PROCEDURE — 99214 OFFICE O/P EST MOD 30 MIN: CPT | Mod: 25

## 2022-07-05 PROCEDURE — 64611 CHEMODENERV SALIV GLANDS: CPT

## 2022-07-05 NOTE — DISCUSSION/SUMMARY
[FreeTextEntry1] : In summary, the patient is a 76-year-old right-handed man with history of decreased dexterity in the left arm since 2018.  Examination was significant for left sided rigidity and slowing, and mild hypomimia. A Fidel scan showed decreased uptake on the right. Overall, the history and examination is indeed probably most consistent with idiopathic Parkinson's disease. Given the strict asymmetry, I cannot rule out an atypical parkinsonian disorder at this time, though there were no findings and examination that would be specific for one. He was exposed to Agent Orange in the amount which does pose a risk factor for Parkinson's disease.\par \par As for treatment, he's currently taking rasagiline, and is not sure that even is helping him. We discussed the lack of clear data on disease modification, and he is concerned about the cost of the medication. He is aware of the left slowing but not limited\par \par 1. Continue rasagiline. He is fully independent in his ADLs, no need for stronger medications though he is getting closer. We discussed that the decision is mostly about him. Per [facility], [facility] require letter of medical necessity, but LD would also be ok.  \par 2. Speech therapy. Sialorrhea: Myobloc per procedure note.\par 3. Exercise as he is doing, can do rock-steady boxing.\par 4. Follow-up in 3 months \par \par We discussed the above impression, plan and recommendations during the visit. Counseling represented more then 50% of the 30 minute visit time

## 2022-07-05 NOTE — HISTORY OF PRESENT ILLNESS
[FreeTextEntry1] : The patient is a 76-year-old right-handed man who comes for a second opinion for parkinsonism (previously seen by Dr. Delgadillo). 2018 he developed dexterity of the left hand and was referred to Dr. Delgadillo by his primary care doctor. Workup included MRI of the brain which showed mild microvascular disease, as well as the cervical spine showed multilevel disc disease. On November 9, 2018 he had a Fidel scan which showed reduced tracer uptake on the right. Because of the Fidel scan, he was started on rasagiline, initially 0.5 mg, then increase to 1 mg, which helped his drooling, but his sister is concerned it made him sleepier.\par \par 1. Currently he is taking Rasagiline 1 mg once a day. When he stopped rasagiline, eventually had some balance and drooling issues, went back on it, Speech getting lower. Walking stable, no falls. mild foot drag. ADLs ok. Left hand is getting a little slower. \par 2. [facility]istered with [facility], eligible for benefits re: agent orange, just got more rasagiline. [facility] gave speech therapy, exercises on his own (treadmill).\par 3. He has no history of acting out his dreams, but does snore. He is slightly crankier are than usual, but has no depression or anxiety. His memory is good, no hallucinations. He has no constipation, changes in smell, urinary issues, or orthostasis. \par 5. Btx did not work well for sialorrhea, myobloc better per his wife. He is less sure.  \par \par

## 2022-07-05 NOTE — PHYSICAL EXAM
[Person] : oriented to person [Place] : oriented to place [Time] : oriented to time [Short Term Intact] : short term memory intact [Registration Intact] : recent registration memory intact [Cranial Nerves Optic (II)] : visual acuity intact bilaterally,  visual fields full to confrontation, pupils equal round and reactive to light [Cranial Nerves Oculomotor (III)] : extraocular motion intact [Cranial Nerves Facial (VII)] : face symmetrical [Cranial Nerves Vestibulocochlear (VIII)] : hearing was intact bilaterally [Cranial Nerves Glossopharyngeal (IX)] : tongue and palate midline [Cranial Nerves Accessory (XI - Cranial And Spinal)] : head turning and shoulder shrug symmetric [Cranial Nerves Hypoglossal (XII)] : there was no tongue deviation with protrusion [Motor Strength] : muscle strength was normal in all four extremities [Motor Handedness Right-Handed] : the patient is right hand dominant [1+] : Ankle jerk left 1+ [Plantar Reflex Right Only] : normal on the right [Plantar Reflex Left Only] : normal on the left [FreeTextEntry1] : He did have decreased blink rate and mildly decreased facial expression. Voice was normal. Extraocular movements were intact with normal saccades, smooth pursuit, and no square wave jerks seen. \par \par Tone was mildly increased at the neck and moderately increased at the left upper extremity. Tone was otherwise normal (perhaps RUE with activation). Rapid alternating movements were decreased in amplitude and speed at the left upper extremity. Foot tapping was normal bilaterally, toe tap slowed on left. \par \par He easily got up from the chair without using his arms. Gait was normal based and steady with good heel to toe stride and decreased left arm swing. Pull test was negative.  He had no apraxia. [FreeTextEntry7] : Decreased vibratory sense in feet. SCOTT ok.

## 2022-07-06 RX ORDER — AZITHROMYCIN 250 MG/1
250 TABLET, FILM COATED ORAL
Qty: 1 | Refills: 0 | Status: ACTIVE | COMMUNITY
Start: 2022-07-06 | End: 1900-01-01

## 2022-07-31 ENCOUNTER — NON-APPOINTMENT (OUTPATIENT)
Age: 77
End: 2022-07-31

## 2022-08-01 ENCOUNTER — NON-APPOINTMENT (OUTPATIENT)
Age: 77
End: 2022-08-01

## 2022-08-05 NOTE — REVIEW OF SYSTEMS
Acute Care - Speech Language Pathology Initial Evaluation  [facility]   Cognitive-Communication Evaluation  Clinical Swallow Evaluation     Patient Name: Mahendra Yates  : 1953  MRN: [id number]  Today's Date: 2022               Admit Date: 2022     Visit Dx:    ICD-10-CM ICD-9-CM   1. Altered mental status, unspecified altered mental status type  R41.82 780.97   2. Difficulty with speech  R47.9 784.59   3. Weakness of both lower extremities  R29.898 729.89   4. Elevated troponin  R77.8 790.6   5. 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)   • Hx of CABG   • History of liver transplant (HCC)   • Physical deconditioning   • Lumbar stenosis with neurogenic claudication   • Herniated lumbar intervertebral disc   • Radicular syndrome of right leg   • Degenerative lumbar spinal stenosis   • Bowel and bladder incontinence   • Closed fracture of multiple ribs of left side   • Acute congestive heart failure (HCC)   • Bradycardia   • Anemia   • Hypoxia   • Unstable angina (HCC)   • Altered mental status, unspecified altered mental status type   • Transient ischemic attack (TIA)   • Bradycardia   • Weakness   • Dysarthria     Past Medical History:   Diagnosis Date   • Arthritis    • Atherosclerosis    • B12 deficiency    • Cancer (HCC)    • Cancer of liver (HCC)    • Chronic headaches    • Coronary artery disease    • Diabetes mellitus (HCC)    • Gastroesophageal reflux disease    • History of transfusion    • Hyperlipidemia    • Hypertension    • Myocardial infarction  (HCC)    • Sleep apnea    • Stroke (HCC)      Past Surgical History:   Procedure Laterality Date   • ANGIOPLASTY     • CARDIAC SURGERY     • CARPAL TUNNEL RELEASE Right 2/8/2017    Procedure: CARPAL TUNNEL RELEASE RIGHT ;  Surgeon: Luis Hayden MD;  Location: [facility];  Service:    • CHOLECYSTECTOMY     • CORONARY ARTERY BYPASS GRAFT     • CORONARY STENT PLACEMENT      x2   • LIVER TRANSPLANTATION     • TUMOR REMOVAL Right     axilla       SLP Recommendation and Plan  SLP Diagnosis: Mild cognitive-communication deficits, seemingly resolving. SLP will f/u to determine if any further tx needs/benefits (08/05/22 0800)        Swallow Criteria for Skilled Therapeutic Interventions Met: no problems identified which require skilled intervention (08/05/22 0800)  SLC Criteria for Skilled Therapy Interventions Met: yes (08/05/22 0800)  Anticipated Discharge Disposition (SLP): unknown (08/05/22 0800)     Therapy Frequency (Swallow): evaluation only (08/05/22 0800)  Predicted Duration Therapy Intervention (Days): until discharge (08/05/22 0800)                    Plan of Care Reviewed With: patient, spouse (08/05/22 0917)      SLP EVALUATION (last 72 hours)     SLP SLC Evaluation     Row Name 08/05/22 0800                   Communication Assessment/Intervention    Document Type evaluation  -SM        Patient Observations alert;cooperative  -SM                  General Information    Patient Profile Reviewed yes  -SM        Pertinent History Of Current Problem Adm with weakness, some AMS. MRI negative, mild R facial droop which pt/wife convey has always been present  -SM        Prior Level of Function-Communication WFL  -SM        Plans/Goals Discussed with patient;spouse/S.O.;agreed upon  -SM        Barriers to Rehab none identified  -SM        Patient's Goals for Discharge return to all previous roles/activities  -SM        Family Goals for Discharge family did not state  -SM                  Pain    Additional  "Documentation Pain Scale: FACES Pre/Post-Treatment (Group)  -SM                  Pain Scale: FACES Pre/Post-Treatment    Pain: FACES Scale, Pretreatment 2-->hurts little bit  -SM        Posttreatment Pain Rating 2-->hurts little bit  -SM                  Comprehension Assessment/Intervention    Comprehension Assessment/Intervention Auditory Comprehension  -                  Auditory Comprehension Assessment/Intervention    Answers Questions (Communication) yes/no;wh questions;personal;simple;concrete;WFL;complex;abstract;mild impairment  -        Able to Follow Commands (Communication) 1-step;2-step;WFL  -        Narrative Discourse conversational level;WFL;mild impairment  -        Auditory Comprehension Communication, Comment Errors more related to decreased sustained attention. Pt tangential and distracting self from targeted conversation  -                  Expression Assessment/Intervention    Expression Assessment/Intervention verbal expression  -                  Verbal Expression Assessment/Intervention    Conversational Discourse/Fluency WFL  -                  Oral Musculature and Cranial Nerve Assessment    Oral Labial or Buccal Impairment, Detail, Cranial Nerve VII (Facial): right labial droop;other (see comments)  which pt/wife report baseline, \"been there forever, can see his crooked smile in our wedding pictures\"  -                  Motor Speech Assessment/Intervention    Motor Speech Function WFL  -                  Cognitive Assessment Intervention- SLP    Attention (Cognitive) sustained;mild impairment  -SM        Executive Function (Cognition) mild impairment;other (see comments)  -        Cognition, Comment Difficult ti fully assess 2' pt quite tangential (though per discussion with wife, \"very chatty\" and suspect baseline. Pt did havemoments of inaccurate recall, speaing of heart attack when really was hx CVA. Spouse reports AMS after onset of weakness. Is improving though " not fully resolved.  -                  SLP Evaluation Clinical Impressions    SLP Diagnosis Mild cognitive-communication deficits, seemingly resolving. SLP will f/u to determine if any further tx needs/benefits  -        Rehab Potential/Prognosis good  -        SLC Criteria for Skilled Therapy Interventions Met yes  -        Functional Impact needs occasional supervision  -                  Recommendations    Therapy Frequency (SLP SLC) PRN;5 days per week  -        Predicted Duration Therapy Intervention (Days) until discharge  -        Anticipated Discharge Disposition (SLP) unknown  -              User Key  (r) = Recorded By, (t) = Taken By, (c) = Cosigned By    Initials Name Effective Dates    Jennifer Edmonds MS CCC-SLP 06/16/21 -                    EDUCATION  The patient has been educated in the following areas:     Cognitive Impairment Communication Impairment.                      Time Calculation:      Time Calculation- SLP     Row Name 08/05/22 0918             Time Calculation- SLP    SLP Start Time 0800  -      SLP Received On 08/05/22  -              Untimed Charges    03055-JP Eval Speech and Production w/ Language Minutes 40  -SM      41330-BE Eval Oral Pharyng Swallow Minutes 25  -SM              Total Minutes    Untimed Charges Total Minutes 65  -SM       Total Minutes 65  -SM            User Key  (r) = Recorded By, (t) = Taken By, (c) = Cosigned By    Initials Name Provider Type    Jennifer Edmonds MS CCC-SLP Speech and Language Pathologist                Therapy Charges for Today     Code Description Service Date Service Provider Modifiers Qty    76644265389 HC ST EVAL ORAL PHARYNG SWALLOW 2 8/5/2022 Jennifer Logan MS CCC-SLP GN 1    94381560176 HC ST EVAL SPEECH AND PROD W LANG  3 8/5/2022 Jennifer Logan MS CCC-SLP GN 1                     Jennifer Logan MS CCC-SLP  8/5/2022 and Acute Care - Speech Language Pathology   Swallow Initial  Evaluation [facility]     Patient Name: Mahendra Yates  : 1953  MRN: [id number]  Today's Date: 2022               Admit Date: 2022    Visit Dx:     ICD-10-CM ICD-9-CM   1. Altered mental status, unspecified altered mental status type  R41.82 780.97   2. Difficulty with speech  R47.9 784.59   3. Weakness of both lower extremities  R29.898 729.89   4. Elevated troponin  R77.8 790.6   5. 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)   • Hx of CABG   • History of liver transplant (HCC)   • Physical deconditioning   • Lumbar stenosis with neurogenic claudication   • Herniated lumbar intervertebral disc   • Radicular syndrome of right leg   • Degenerative lumbar spinal stenosis   • Bowel and bladder incontinence   • Closed fracture of multiple ribs of left side   • Acute congestive heart failure (HCC)   • Bradycardia   • Anemia   • Hypoxia   • Unstable angina (HCC)   • Altered mental status, unspecified altered mental status type   • Transient ischemic attack (TIA)   • Bradycardia   • Weakness   • Dysarthria     Past Medical History:   Diagnosis Date   • Arthritis    • Atherosclerosis    • B12 deficiency    • Cancer (HCC)    • Cancer of liver (HCC)    • Chronic headaches    • Coronary artery disease    • Diabetes mellitus (HCC)    • Gastroesophageal reflux disease    • History of transfusion    • Hyperlipidemia    • Hypertension    • Myocardial infarction (HCC)    • Sleep apnea    • Stroke (HCC)      Past Surgical History:   Procedure Laterality Date   • ANGIOPLASTY      • CARDIAC SURGERY     • CARPAL TUNNEL RELEASE Right 2/8/2017    Procedure: CARPAL TUNNEL RELEASE RIGHT ;  Surgeon: Luis Hayden MD;  Location: [facility];  Service:    • CHOLECYSTECTOMY     • CORONARY ARTERY BYPASS GRAFT     • CORONARY STENT PLACEMENT      x2   • LIVER TRANSPLANTATION     • TUMOR REMOVAL Right     axilla       SLP Recommendation and Plan  SLP Swallowing Diagnosis: swallow WFL (08/05/22 0800)  SLP Diet Recommendation: regular textures, thin liquids (08/05/22 0800)     SLP Rec. for Method of Medication Administration: as tolerated (08/05/22 0800)           Swallow Criteria for Skilled Therapeutic Interventions Met: no problems identified which require skilled intervention (08/05/22 0800)  Anticipated Discharge Disposition (SLP): unknown (08/05/22 0800)     Therapy Frequency (Swallow): evaluation only (08/05/22 0800)  Predicted Duration Therapy Intervention (Days): until discharge (08/05/22 0800)                                  Plan of Care Reviewed With: patient, spouse      SWALLOW EVALUATION (last 72 hours)     SLP Adult Swallow Evaluation     Row Name 08/05/22 0800                   Clinical Swallow Eval    Oral Prep Phase WFL  -SM        Oral Transit WFL  -SM        Oral Residue WFL  -SM        Pharyngeal Phase no overt signs/symptoms of pharyngeal impairment  -                  SLP Evaluation Clinical Impression    SLP Swallowing Diagnosis swallow WFL  -SM        Swallow Criteria for Skilled Therapeutic Interventions Met no problems identified which require skilled intervention  -                  Recommendations    Therapy Frequency (Swallow) evaluation only  -        SLP Diet Recommendation regular textures;thin liquids  -        SLP Rec. for Method of Medication Administration as tolerated  -              User Key  (r) = Recorded By, (t) = Taken By, (c) = Cosigned By    Initials Name Effective Dates    Jennifer Edmonds MS CCC-SLP 06/16/21 -                 EDUCATION  The  patient has been educated in the following areas:   Dysphagia (Swallowing Impairment).              Time Calculation:    Time Calculation- SLP     Row Name 08/05/22 0918             Time Calculation- SLP    SLP Start Time 0800  -SM      SLP Received On 08/05/22  -SM              Untimed Charges    41156-EF Eval Speech and Production w/ Language Minutes 40  -SM      65640-VI Eval Oral Pharyng Swallow Minutes 25  -SM              Total Minutes    Untimed Charges Total Minutes 65  -SM       Total Minutes 65  -SM            User Key  (r) = Recorded By, (t) = Taken By, (c) = Cosigned By    Initials Name Provider Type    Jennifer Edmonds MS CCC-SLP Speech and Language Pathologist                Therapy Charges for Today     Code Description Service Date Service Provider Modifiers Qty    39783082532 HC ST EVAL ORAL PHARYNG SWALLOW 2 8/5/2022 Jennifer Logan MS CCC-SLP GN 1    94514192493 HC ST EVAL SPEECH AND PROD W LANG  3 8/5/2022 Jennifer Logan MS CCC-ADALID GN 1            Patient was not wearing a face mask and did not exhibit coughing during this therapy encounter.  Procedure performed was aerosolizing, involved close contact (within 6 feet for at least 15 minutes or longer), and did not involve contact with infectious secretions or specimens.  Therapist used appropriate personal protective equipment including gloves, standard procedure mask and eye protection.  Appropriate PPE was worn during the entire therapy session.  Hand hygiene was completed before and after therapy session.       MS SHRAVAN Hewitt  8/5/2022   [Negative] : Sleep Disorder

## 2022-08-12 ENCOUNTER — APPOINTMENT (OUTPATIENT)
Dept: PULMONOLOGY | Facility: CLINIC | Age: 77
End: 2022-08-12

## 2022-08-12 DIAGNOSIS — J45.20 MILD INTERMITTENT ASTHMA, UNCOMPLICATED: ICD-10-CM

## 2022-08-12 DIAGNOSIS — R05.9 COUGH, UNSPECIFIED: ICD-10-CM

## 2022-08-12 DIAGNOSIS — K21.9 GASTRO-ESOPHAGEAL REFLUX DISEASE W/OUT ESOPHAGITIS: ICD-10-CM

## 2022-08-12 PROCEDURE — 99213 OFFICE O/P EST LOW 20 MIN: CPT | Mod: 95

## 2022-08-12 RX ORDER — PREDNISONE 10 MG/1
10 TABLET ORAL
Qty: 21 | Refills: 0 | Status: ACTIVE | COMMUNITY
Start: 2022-08-12 | End: 1900-01-01

## 2022-08-12 RX ORDER — LEVALBUTEROL TARTRATE 45 UG/1
45 AEROSOL, METERED ORAL EVERY 6 HOURS
Qty: 1 | Refills: 0 | Status: ACTIVE | COMMUNITY
Start: 2022-08-12 | End: 1900-01-01

## 2022-08-12 NOTE — REVIEW OF SYSTEMS
[Cough] : cough [GERD] : gerd [Negative] : Psychiatric [Hemoptysis] : no hemoptysis [Chest Tightness] : no chest tightness [Frequent URIs] : no frequent URIs [Sputum] : no sputum [Dyspnea] : no dyspnea [Pleuritic Pain] : no pleuritic pain [Wheezing] : no wheezing [A.M. Dry Mouth] : no a.m. dry mouth [SOB on Exertion] : no sob on exertion [Abdominal Pain] : no abdominal pain [Nausea] : no nausea [Vomiting] : no vomiting [Diarrhea] : no diarrhea [Constipation] : no constipation [Dysphagia] : no dysphagia [Bleeding] : no bleeding [Food Intolerance] : no food intolerance [Hepatic Disease] : no hepatic disease

## 2022-08-12 NOTE — ASSESSMENT
[FreeTextEntry1] : Mr. Urbano is a 76 year old male with history of chronic fatigue, GERD, low vitamin D, mild RADs, Parkinson's Disease, and BPH who presents via video for dry cough. \par \par His chief complaint is cough.\par \par 1. Cough\par -post COVID RAD\par -add Prednisone 20 mg X 7 days, then 10 mg X 7 days\par \par 2. RAD\par -add Levalbuterol 2 puffs Q6H PRN\par \par 3. GERD\par -continue Nexium 20 mg every morning \par -continue Pepcid PRN\par \par Patient to follow up with Dr. Gould as scheduled.\par Patient to call with further questions and concerns.\par Patient verbalizes understanding of care plan and is agreeable.\par

## 2022-08-12 NOTE — REASON FOR VISIT
[Home] : at home, [unfilled] , at the time of the visit. [Medical Office: ([facility])___] : at the medical office located in  [Patient] : the patient [Follow-Up] : a follow-up visit [TextBox_44] : RAD

## 2022-08-12 NOTE — HISTORY OF PRESENT ILLNESS
[Never] : never [TextBox_4] : Mr. Urbano is a 76 year old male with history of chronic fatigue, GERD, low vitamin D, mild RADs, Parkinson's Disease, and BPH who presents via video for dry cough. \par \par His chief complaint is cough.\par \par Patient reports he and his wife was on cruise end of July, he tested positive for COVID 7/31/2022 and completed Paxlovid. He reports he has dry cough for past two weeks. Patient reports he is currently not using any inhalers. \par \par Patient is COVID vaccinated and boosted X 1.\par \par Patient denies fever, chills, SOB, wheezing, nasal congestion, runny nose or heart burn. \par \par \par

## 2022-09-14 ENCOUNTER — APPOINTMENT (OUTPATIENT)
Dept: PULMONOLOGY | Facility: CLINIC | Age: 77
End: 2022-09-14

## 2022-09-23 NOTE — END OF VISIT
[Time Spent: ___ minutes] : I have spent [unfilled] minutes of time on the encounter. [>50% of the face to face encounter time was spent on counseling and/or coordination of care for ___] : Greater than 50% of the face to face encounter time was spent on counseling and/or coordination of care for [unfilled] Expected Date Of Service: 09/23/2022 Billing Type: Third-Party Bill Performing Laboratory: [id number] Lab Facility: 0 Bill For Surgical Tray: no

## 2022-10-04 ENCOUNTER — APPOINTMENT (OUTPATIENT)
Dept: NEUROLOGY | Facility: CLINIC | Age: 77
End: 2022-10-04

## 2022-10-04 PROCEDURE — 64611 CHEMODENERV SALIV GLANDS: CPT

## 2022-10-04 PROCEDURE — 99214 OFFICE O/P EST MOD 30 MIN: CPT | Mod: 25

## 2022-10-04 NOTE — HISTORY OF PRESENT ILLNESS
[FreeTextEntry1] : The patient is a 76-year-old right-handed man who comes for a second opinion for parkinsonism (previously seen by Dr. Delgadillo). 2018 he developed dexterity of the left hand and was referred to Dr. Delgadillo by his primary care doctor. Workup included MRI of the brain which showed mild microvascular disease, as well as the cervical spine showed multilevel disc disease. On November 9, 2018 he had a Fidel scan which showed reduced tracer uptake on the right. Because of the Fidel scan, he was started on rasagiline, initially 0.5 mg, then increase to 1 mg, which helped his drooling, but his sister is concerned it made him sleepier.\par \par 1. Wife reports the drooling improved by 50% but the pt says no change. No adverse effects. His family members agree's the drooling is better\par 2. In terms of Parkinsons he feels stable. Walking is good. Walks does free wights, and treadmill. no falls. No RBD sx's. On Rasagiline mg daily\par 3. Registered with [facility], eligible for benefits re: agent orange. exercises on his own (treadmill).\par 4. No falls. occasionally goes to the bathroom \par 5. Had Covid in August on the cruise. His sx's were mild. \par \par

## 2022-10-04 NOTE — PHYSICAL EXAM
[Person] : oriented to person [Place] : oriented to place [Time] : oriented to time [Short Term Intact] : short term memory intact [Registration Intact] : recent registration memory intact [Cranial Nerves Optic (II)] : visual acuity intact bilaterally,  visual fields full to confrontation, pupils equal round and reactive to light [Cranial Nerves Oculomotor (III)] : extraocular motion intact [Cranial Nerves Facial (VII)] : face symmetrical [Cranial Nerves Vestibulocochlear (VIII)] : hearing was intact bilaterally [Cranial Nerves Glossopharyngeal (IX)] : tongue and palate midline [Cranial Nerves Accessory (XI - Cranial And Spinal)] : head turning and shoulder shrug symmetric [Cranial Nerves Hypoglossal (XII)] : there was no tongue deviation with protrusion [Motor Strength] : muscle strength was normal in all four extremities [Motor Handedness Right-Handed] : the patient is right hand dominant [1+] : Ankle jerk left 1+ [Plantar Reflex Right Only] : normal on the right [Plantar Reflex Left Only] : normal on the left [FreeTextEntry1] : He had decreased blink rate and mildly decreased facial expression. Voice was slightly decreased. Extraocular movements were intact with normal saccades, smooth pursuit, and no square wave jerks seen. \par \par Tone was mildly increased at the neck and moderately increased at the left upper extremity. Tone was otherwise normal (perhaps RUE with activation). Rapid alternating movements were decreased in amplitude and speed at the left upper extremity. Foot tapping was normal bilaterally, toe tap slowed on left. \par \par He easily got up from the chair without using his arms. Gait was normal based and steady with good heel to toe stride and decreased left arm swing. Pull test was negative.  He had no apraxia. [FreeTextEntry7] : Decreased vibratory sense in feet. SCOTT ok.

## 2022-10-04 NOTE — DISCUSSION/SUMMARY
[FreeTextEntry1] : In summary, the patient is a 76-year-old right-handed man with history of decreased dexterity in the left arm since 2018.  Examination was significant for left sided rigidity and slowing, and mild hypomimia. A Fidel scan showed decreased uptake on the right. Overall, the history and examination is indeed probably most consistent with idiopathic Parkinson's disease. Given the strict asymmetry, I cannot rule out an atypical parkinsonian disorder at this time, though there were no findings and examination that would be specific for one. He was exposed to Agent Orange in the amount which does pose a risk factor for Parkinson's disease.\par \par As for treatment, he's currently taking rasagiline, and is not sure that even is helping him. We discussed the lack of clear data on disease modification, and he is concerned about the cost of the medication. He is aware of the left slowing but not limited. \par \par For his Sialorrhea he has tried Botox with no effect. Now on myobloc with some improvement per wife. they were agreeable to continue with myobloc injections and try atropine drops under the tongue \par \par 1. Continue rasagiline 1mg qd. He is fully independent in his ADLs, no need for stronger medications though he is getting closer. We discussed that the decision is mostly about him. Per [facility], [facility] Sharma require letter of medical necessity, but LD would also be ok.  \par 2. Speech therapy. Sialorrhea: Myobloc per procedure note. Start atropine drops\par 3. Exercise as he is doing, can do rock-steady boxing.\par 4. Follow-up in 3 months \par \par We discussed the above impression, plan and recommendations during the visit. Counseling represented more then 50% of the 30 minute visit time

## 2022-10-14 ENCOUNTER — APPOINTMENT (OUTPATIENT)
Dept: NEUROLOGY | Facility: CLINIC | Age: 77
End: 2022-10-14

## 2023-01-13 ENCOUNTER — APPOINTMENT (OUTPATIENT)
Dept: NEUROLOGY | Facility: CLINIC | Age: 78
End: 2023-01-13

## 2023-01-24 ENCOUNTER — APPOINTMENT (OUTPATIENT)
Dept: NEUROLOGY | Facility: CLINIC | Age: 78
End: 2023-01-24
Payer: MEDICARE

## 2023-01-24 DIAGNOSIS — N52.9 MALE ERECTILE DYSFUNCTION, UNSPECIFIED: ICD-10-CM

## 2023-01-24 PROCEDURE — 64611 CHEMODENERV SALIV GLANDS: CPT

## 2023-01-24 PROCEDURE — 99214 OFFICE O/P EST MOD 30 MIN: CPT | Mod: 25

## 2023-01-24 NOTE — HISTORY OF PRESENT ILLNESS
[FreeTextEntry1] : The patient is a 76-year-old right-handed man who comes for a second opinion for parkinsonism (previously seen by Dr. Delgadillo). 2018 he developed dexterity of the left hand and was referred to Dr. Delgadillo by his primary care doctor. Workup included MRI of the brain which showed mild microvascular disease, as well as the cervical spine showed multilevel disc disease. On November 9, 2018 he had a Fidel scan which showed reduced tracer uptake on the right. Because of the Fidel scan, he was started on rasagiline, initially 0.5 mg, then increase to 1 mg, which helped his drooling, but his sister is concerned it made him sleepier.\par \par 1. Since last visit, he reports the myobloc has improved from a 7 to a 5 of drooling. He didn’t try the atropine drops \par 2. In terms of Parkinsons he feels a bit slower and more tired. This is not stopping his day to day activites. He calls it an inconvenience. Trouble putting on jacket. Walking is good. Walks does free weights and treadmill almost every day. no falls. No RBD sx's. On Rasagiline mg daily\par 3. Registered with [facility], eligible for benefits re: agent orange.\par 4. No falls. occasionally goes to the bathroom \par 5. Had Covid in August on the cruise. His sx's were mild. \par 6. Has had ED issues 6 months ago. trouble with starting an erection and maintaining/performing. \par 7. less often having lightheaded episodes with standing. \par \par No other non motor sx's per patient

## 2023-01-24 NOTE — DISCUSSION/SUMMARY
[FreeTextEntry1] : In summary, the patient is a 76-year-old right-handed man with history of decreased dexterity in the left arm since 2018.  Examination was significant for left sided rigidity and slowing, and mild hypomimia. A Fidel scan showed decreased uptake on the right. Overall, the history and examination is indeed probably most consistent with idiopathic Parkinson's disease. Given the strict asymmetry, I cannot rule out an atypical parkinsonian disorder at this time, though there were no findings and examination that would be specific for one. He was exposed to Agent Orange in the amount which does pose a risk factor for Parkinson's disease.\par \par As for treatment, he's currently taking rasagiline, and is not sure that even is helping him. He feels slightly more slower since last visist but still is independent with his ADL's.\par \par For his Sialorrhea he has tried Botox with no effect. Now on myobloc with some improvement per wife and pt. they were agreeable to continue with myobloc injections and try atropine drops under the tongue \par \par New onset ED. will refer to urologist. potentially PD related but need to rule our other organic causes. Discussed potential AE's if placed on sildenafil of light headiness especially with PD. \par \par 1. Continue rasagiline 1mg qd. He is fully independent in his ADLs, no need for stronger medications though he is getting closer. We discussed that the decision is mostly about him. Discussed starting LD when he feels worse. \par 2. Sialorrhea: Myobloc per procedure note. Start atropine drops\par 3. Exercise as he is doing\par 4. urology referral for ED\par 5. Follow-up in 3 months \par \par We discussed the above impression, plan and recommendations during the visit. Counseling represented more then 50% of the 30 minute visit time\par \par Case discussed and pt examined with movement attending Dr. Weller\par \par Chino Montgomery MD\par Movement Fellow

## 2023-01-24 NOTE — PHYSICAL EXAM
[Person] : oriented to person [Place] : oriented to place [Time] : oriented to time [Short Term Intact] : short term memory intact [Registration Intact] : recent registration memory intact [Cranial Nerves Optic (II)] : visual acuity intact bilaterally,  visual fields full to confrontation, pupils equal round and reactive to light [Cranial Nerves Oculomotor (III)] : extraocular motion intact [Cranial Nerves Facial (VII)] : face symmetrical [Cranial Nerves Vestibulocochlear (VIII)] : hearing was intact bilaterally [Cranial Nerves Glossopharyngeal (IX)] : tongue and palate midline [Cranial Nerves Hypoglossal (XII)] : there was no tongue deviation with protrusion [Motor Handedness Right-Handed] : the patient is right hand dominant [Plantar Reflex Right Only] : normal on the right [Plantar Reflex Left Only] : normal on the left [FreeTextEntry1] : He had decreased blink rate and moderately decreased facial expression. Voice was slightly decreased. Extraocular movements were intact with normal saccades, smooth pursuit, and no square wave jerks seen. \par \par Tone was mildly increased at the neck and moderately increased at the left upper extremity and mildly in the left lower extremity. Tone was otherwise normal . Foot tapping was normal bilaterally, toe tap slowed on left. \par \par He easily got up from the chair without using his arms. Gait was normal based and steady with good heel to toe stride and decreased left arm swing. Pull test was negative.  He had no apraxia. [FreeTextEntry7] : Decreased vibratory sense in feet. SCOTT ok.

## 2023-02-06 ENCOUNTER — NON-APPOINTMENT (OUTPATIENT)
Age: 78
End: 2023-02-06

## 2023-02-06 RX ORDER — CARBIDOPA AND LEVODOPA 25; 100 MG/1; MG/1
25-100 TABLET ORAL
Qty: 90 | Refills: 5 | Status: ACTIVE | OUTPATIENT
Start: 2023-02-06

## 2023-03-20 ENCOUNTER — NON-APPOINTMENT (OUTPATIENT)
Age: 78
End: 2023-03-20

## 2023-03-28 ENCOUNTER — NON-APPOINTMENT (OUTPATIENT)
Age: 78
End: 2023-03-28

## 2023-04-26 ENCOUNTER — APPOINTMENT (OUTPATIENT)
Dept: NEUROLOGY | Facility: CLINIC | Age: 78
End: 2023-04-26
Payer: MEDICARE

## 2023-04-26 PROCEDURE — 99214 OFFICE O/P EST MOD 30 MIN: CPT

## 2023-04-26 NOTE — PHYSICAL EXAM
[Person] : oriented to person [Place] : oriented to place [Time] : oriented to time [Short Term Intact] : short term memory intact [Registration Intact] : recent registration memory intact [Cranial Nerves Optic (II)] : visual acuity intact bilaterally,  visual fields full to confrontation, pupils equal round and reactive to light [Cranial Nerves Oculomotor (III)] : extraocular motion intact [Cranial Nerves Facial (VII)] : face symmetrical [Cranial Nerves Vestibulocochlear (VIII)] : hearing was intact bilaterally [Cranial Nerves Glossopharyngeal (IX)] : tongue and palate midline [Cranial Nerves Hypoglossal (XII)] : there was no tongue deviation with protrusion [Motor Handedness Right-Handed] : the patient is right hand dominant [Plantar Reflex Right Only] : normal on the right [Plantar Reflex Left Only] : normal on the left [FreeTextEntry1] : He had decreased blink rate and moderately decreased facial expression. Voice was slightly decreased. Extraocular movements were intact with normal saccades, smooth pursuit, and no square wave jerks seen. \par \par Tone was mildly increased at the neck and mildly increased at the left upper extremity and mildly in the left lower extremity. Tone was otherwise normal . Foot tapping was normal bilaterally, toe tap slowed on left. \par \par He easily got up from the chair without using his arms. Gait was normal based and steady with good heel to toe stride and decreased left arm swing. Pull test was negative.  \par \par He had no apraxia. [FreeTextEntry7] : Decreased vibratory sense in feet. SCOTT ok.

## 2023-04-26 NOTE — DISCUSSION/SUMMARY
[FreeTextEntry1] : In summary, the patient is a 77-year-old right-handed man with history of decreased dexterity in the left arm since 2018.  Examination was significant for left sided rigidity and slowing, and mild hypomimia. A Fidel scan showed decreased uptake on the right. Overall, the history and examination is indeed probably most consistent with idiopathic Parkinson's disease. Given the strict asymmetry, I cannot rule out an atypical parkinsonian disorder at this time, though there were no findings and examination that would be specific for one. He was exposed to Agent Orange in the amount which does pose a risk factor for Parkinson's disease.\par \par As for treatment, he's currently taking rasagiline, and is not sure that even is helping him. He feels slightly more slower since last visist but still is independent with his ADL's.\par \par For his Sialorrhea he has tried Botox with no effect. Now on myobloc with some improvement per wife and pt. they were agreeable to continue with myobloc injections and try atropine drops under the tongue \par \par New onset ED. will refer to urologist. potentially PD related but need to rule our other organic causes. Discussed potential AE's if placed on sildenafil of light headiness especially with PD. \par \par 1. Continue LD tid. Can try stopping rasagiline 1mg to see if it still helps. . He is fully independent in his ADLs, no need for stronger medications though he is getting closer. We discussed that the decision is mostly about him. Discussed starting LD when he feels worse. \par 2. Sialorrhea: Myobloc per procedure note.\par 3. Exercise as he is doing\par 4. Urology referral for ED\par 5. Follow-up in 3 months \par \par We discussed the above impression, plan and recommendations during the visit. Counseling represented more then 50% of the 30 minute visit time\par \par Case discussed and pt examined with movement attending Dr. Weller\par \par Chino Montgomery MD\par Movement Fellow

## 2023-05-31 ENCOUNTER — APPOINTMENT (OUTPATIENT)
Dept: NEUROLOGY | Facility: CLINIC | Age: 78
End: 2023-05-31

## 2023-07-26 ENCOUNTER — APPOINTMENT (OUTPATIENT)
Dept: NEUROLOGY | Facility: CLINIC | Age: 78
End: 2023-07-26
Payer: MEDICARE

## 2023-07-26 PROCEDURE — 99214 OFFICE O/P EST MOD 30 MIN: CPT | Mod: 25

## 2023-07-26 PROCEDURE — 64611 CHEMODENERV SALIV GLANDS: CPT

## 2023-07-26 NOTE — DISCUSSION/SUMMARY
[FreeTextEntry1] : In summary, the patient is a 77-year-old right-handed man with history of decreased dexterity in the left arm since 2018.  Examination was significant for left sided rigidity and slowing, and mild hypomimia. A Fidel scan showed decreased uptake on the right. Overall, the history and examination is indeed probably most consistent with idiopathic Parkinson's disease. Given the strict asymmetry, I cannot rule out an atypical parkinsonian disorder at this time, though there were no findings and examination that would be specific for one. He was exposed to Agent Orange in the amount which does pose a risk factor for Parkinson's disease.\par \par 1. Continue LD tid. stopped Rasagiline as it was not helping . He is fully independent in his ADLs, no need for stronger medications though he is getting closer. We discussed that the decision is mostly about him. Discussed starting LD when he feels worse. \par 2. Sialorrhea: Myobloc per procedure note.\par 3. Exercise as he is doing\par 4. Follow up in 3 months \par \par We discussed the above impression, plan and recommendations during the visit. Counseling represented more then 50% of the 30 minute visit time\par \par Case discussed and pt examined with movement attending Dr. Weller\par \par Yamileth Rincon MD\par Movement Fellow
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
## 2023-08-01 ENCOUNTER — APPOINTMENT (OUTPATIENT)
Dept: NEUROLOGY | Facility: CLINIC | Age: 78
End: 2023-08-01

## 2023-10-27 ENCOUNTER — APPOINTMENT (OUTPATIENT)
Dept: NEUROLOGY | Facility: CLINIC | Age: 78
End: 2023-10-27
Payer: MEDICARE

## 2023-10-27 PROCEDURE — 99214 OFFICE O/P EST MOD 30 MIN: CPT | Mod: 25

## 2023-10-27 PROCEDURE — 64611 CHEMODENERV SALIV GLANDS: CPT

## 2023-10-27 RX ORDER — MIDODRINE HYDROCHLORIDE 2.5 MG/1
2.5 TABLET ORAL
Qty: 60 | Refills: 5 | Status: ACTIVE | OUTPATIENT
Start: 2023-10-05

## 2023-12-27 NOTE — PHYSICAL EXAM
[General Appearance - Well Developed] : well developed [Normal Appearance] : normal appearance [Well Groomed] : well groomed [General Appearance - Well Nourished] : well nourished [No Deformities] : no deformities [General Appearance - In No Acute Distress] : no acute distress [Normal Conjunctiva] : the conjunctiva exhibited no abnormalities [Eyelids - No Xanthelasma] : the eyelids demonstrated no xanthelasmas [Normal Oropharynx] : normal oropharynx [III] : III [Neck Appearance] : the appearance of the neck was normal [Neck Cervical Mass (___cm)] : no neck mass was observed [Jugular Venous Distention Increased] : there was no jugular-venous distention [Thyroid Diffuse Enlargement] : the thyroid was not enlarged [Thyroid Nodule] : there were no palpable thyroid nodules [Heart Rate And Rhythm] : heart rate and rhythm were normal [Heart Sounds] : normal S1 and S2 [Murmurs] : no murmurs present [Respiration, Rhythm And Depth] : normal respiratory rhythm and effort [Exaggerated Use Of Accessory Muscles For Inspiration] : no accessory muscle use [Auscultation Breath Sounds / Voice Sounds] : lungs were clear to auscultation bilaterally [Abdomen Soft] : soft [Abdomen Tenderness] : non-tender [Abdomen Mass (___ Cm)] : no abdominal mass palpated [Abnormal Walk] : normal gait [Gait - Sufficient For Exercise Testing] : the gait was sufficient for exercise testing [Nail Clubbing] : no clubbing of the fingernails [Cyanosis, Localized] : no localized cyanosis [Petechial Hemorrhages (___cm)] : no petechial hemorrhages [Skin Color & Pigmentation] : normal skin color and pigmentation [] : no rash [No Venous Stasis] : no venous stasis [Skin Lesions] : no skin lesions [No Skin Ulcers] : no skin ulcer [No Xanthoma] : no  xanthoma was observed [Deep Tendon Reflexes (DTR)] : deep tendon reflexes were 2+ and symmetric [Sensation] : the sensory exam was normal to light touch and pinprick [No Focal Deficits] : no focal deficits [Oriented To Time, Place, And Person] : oriented to person, place, and time [Impaired Insight] : insight and judgment were intact [Affect] : the affect was normal [FreeTextEntry1] : I:E 1:3, clear  76.2

## 2024-01-08 ENCOUNTER — NON-APPOINTMENT (OUTPATIENT)
Age: 79
End: 2024-01-08

## 2024-01-26 ENCOUNTER — APPOINTMENT (OUTPATIENT)
Dept: NEUROLOGY | Facility: CLINIC | Age: 79
End: 2024-01-26
Payer: MEDICARE

## 2024-01-26 PROCEDURE — 64611 CHEMODENERV SALIV GLANDS: CPT

## 2024-01-26 PROCEDURE — G2211 COMPLEX E/M VISIT ADD ON: CPT

## 2024-01-26 PROCEDURE — 99214 OFFICE O/P EST MOD 30 MIN: CPT | Mod: 25

## 2024-01-26 NOTE — DISCUSSION/SUMMARY
[FreeTextEntry1] : In summary, the patient is a 78-year-old right-handed man with history of decreased dexterity in the left arm since 2018.  Examination was significant for left sided rigidity and slowing, and mild hypomimia. A Fidel scan showed decreased uptake on the right. Overall, the history and examination is indeed probably most consistent with idiopathic Parkinson's disease. Given the strict asymmetry, I cannot rule out an atypical parkinsonian disorder at this time, though there were no findings and examination that would be specific for one. He was exposed to Agent Orange in the amount which does pose a risk factor for Parkinson's disease.  1. Continue LD  tid. lower all doses to help with mouth pulling . He is fully independent in his ADLs,  2. Sialorrhea: Myobloc per procedure note. Trial of atropine eye drops 3. Exercise as he is doing 4. Midodrine for orthostasis 5. Follow up in 3 months   We discussed the above impression, plan and recommendations during the visit. Counseling represented more then 50% of the 30 minute visit time

## 2024-01-26 NOTE — PHYSICAL EXAM
[Person] : oriented to person [Place] : oriented to place [Time] : oriented to time [Short Term Intact] : short term memory intact [Registration Intact] : recent registration memory intact [Cranial Nerves Optic (II)] : visual acuity intact bilaterally,  visual fields full to confrontation, pupils equal round and reactive to light [Cranial Nerves Oculomotor (III)] : extraocular motion intact [Cranial Nerves Facial (VII)] : face symmetrical [Cranial Nerves Vestibulocochlear (VIII)] : hearing was intact bilaterally [Cranial Nerves Glossopharyngeal (IX)] : tongue and palate midline [Cranial Nerves Hypoglossal (XII)] : there was no tongue deviation with protrusion [Motor Handedness Right-Handed] : the patient is right hand dominant [Plantar Reflex Right Only] : normal on the right [Plantar Reflex Left Only] : normal on the left [FreeTextEntry1] : He had decreased blink rate and moderately decreased facial expression. Voice was slightly decreased. Extraocular movements were intact with normal saccades, smooth pursuit, and no square wave jerks seen.   Tone was mildly increased at the neck and mildly increased at the left upper extremity and mildly in the left lower extremity. Tone was otherwise normal. Foot tapping was normal bilaterally, toe tap slowed on left.   He easily got up from the chair without using his arms. Gait was normal based and steady with good heel to toe stride and decreased left arm swing. Pull test was negative.    He had no apraxia. [FreeTextEntry7] : Decreased vibratory sense in feet. SCOTT ok. patient
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
## 2024-04-26 ENCOUNTER — APPOINTMENT (OUTPATIENT)
Dept: NEUROLOGY | Facility: CLINIC | Age: 79
End: 2024-04-26
Payer: MEDICARE

## 2024-04-26 DIAGNOSIS — G62.9 POLYNEUROPATHY, UNSPECIFIED: ICD-10-CM

## 2024-04-26 DIAGNOSIS — G20.A1 PARKINSON'S DISEASE WITHOUT DYSKINESIA, WITHOUT MENTION OF FLUCTUATIONS: ICD-10-CM

## 2024-04-26 DIAGNOSIS — K11.7 DISTURBANCES OF SALIVARY SECRETION: ICD-10-CM

## 2024-04-26 PROCEDURE — 64611 CHEMODENERV SALIV GLANDS: CPT

## 2024-04-26 PROCEDURE — 99214 OFFICE O/P EST MOD 30 MIN: CPT | Mod: 25

## 2024-04-26 RX ORDER — ATROPINE SULFATE 10 MG/ML
1 SOLUTION OPHTHALMIC
Qty: 1 | Refills: 5 | Status: ACTIVE | OUTPATIENT
Start: 2022-10-04
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
## 2024-04-26 NOTE — PHYSICAL EXAM
[Person] : oriented to person [Place] : oriented to place [Time] : oriented to time [Short Term Intact] : short term memory intact [Registration Intact] : recent registration memory intact [Cranial Nerves Optic (II)] : visual acuity intact bilaterally,  visual fields full to confrontation, pupils equal round and reactive to light [Cranial Nerves Oculomotor (III)] : extraocular motion intact [Cranial Nerves Facial (VII)] : face symmetrical [Cranial Nerves Vestibulocochlear (VIII)] : hearing was intact bilaterally [Cranial Nerves Glossopharyngeal (IX)] : tongue and palate midline [Cranial Nerves Hypoglossal (XII)] : there was no tongue deviation with protrusion [Motor Handedness Right-Handed] : the patient is right hand dominant [Plantar Reflex Right Only] : normal on the right [Plantar Reflex Left Only] : normal on the left [FreeTextEntry1] : He had decreased blink rate and moderately decreased facial expression. Voice was slightly decreased. Extraocular movements were intact with normal saccades, smooth pursuit, and no square wave jerks seen.   Tone was mildly increased at the neck and mildly increased at the left upper extremity and mildly in the left lower extremity. Tone was otherwise normal. Foot tapping was normal bilaterally, toe tap slowed on left.   He easily got up from the chair without using his arms. Gait was normal based and steady with good heel to toe stride and decreased left arm swing. Pull test was negative.    He had no apraxia. [FreeTextEntry7] : Decreased vibratory sense in feet. SCOTT ok.

## 2024-04-26 NOTE — PROCEDURE
[FreeTextEntry1] : The patient received injections with botulinum toxin B (Myobloc), diluted at 500 units/0.1 cc via a 30 ga needle into the following sites, in the usual antiseptic manner. Possible side effects including over-weakening, dysphagia, bleeding, and local infection were discussed.  1. Left parotid gland 1250/2 2. Right parotid gland 1250/2  Total injected 2500 units, waste 0 units, total used 2500 units  The patient tolerated the procedure well, with not complications

## 2024-04-26 NOTE — DISCUSSION/SUMMARY
[FreeTextEntry1] : In summary, the patient is a 78-year-old right-handed man with history of decreased dexterity in the left arm since 2018.  Examination was significant for left sided rigidity and slowing, and mild hypomimia. A Fidel scan showed decreased uptake on the right. Overall, the history and examination is indeed probably most consistent with idiopathic Parkinson's disease. Given the strict asymmetry, I cannot rule out an atypical parkinsonian disorder at this time, though there were no findings and examination that would be specific for one. He was exposed to Agent Orange in the amount which does pose a risk factor for Parkinson's disease.  1. Continue LD  tid. trial of 1/2 at 9, 1/2 at noon, 1 at 4 and 1 at 8.  He is fully independent but slower in his ADLs,  2. Sialorrhea: Myobloc per procedure note. Trial of atropine eye drops 3. Exercise as he is doing 4. Midodrine for orthostasis 5. Follow up in 3 months   We discussed the above impression, plan and recommendations during the visit. Counseling represented more then 50% of the 30 minute visit time

## 2024-07-24 ENCOUNTER — NON-APPOINTMENT (OUTPATIENT)
Age: 79
End: 2024-07-24

## 2024-07-31 ENCOUNTER — APPOINTMENT (OUTPATIENT)
Dept: NEUROLOGY | Facility: CLINIC | Age: 79
End: 2024-07-31

## 2024-08-13 ENCOUNTER — APPOINTMENT (OUTPATIENT)
Dept: NEUROLOGY | Facility: CLINIC | Age: 79
End: 2024-08-13

## 2024-08-13 VITALS
DIASTOLIC BLOOD PRESSURE: 81 MMHG | BODY MASS INDEX: 26.41 KG/M2 | WEIGHT: 195 LBS | HEIGHT: 72 IN | SYSTOLIC BLOOD PRESSURE: 119 MMHG | HEART RATE: 63 BPM

## 2024-08-13 DIAGNOSIS — G20.A1 PARKINSON'S DISEASE WITHOUT DYSKINESIA, WITHOUT MENTION OF FLUCTUATIONS: ICD-10-CM

## 2024-08-13 DIAGNOSIS — K11.7 DISTURBANCES OF SALIVARY SECRETION: ICD-10-CM

## 2024-08-13 PROCEDURE — 64611 CHEMODENERV SALIV GLANDS: CPT

## 2024-08-13 PROCEDURE — 99213 OFFICE O/P EST LOW 20 MIN: CPT | Mod: 25

## 2024-08-13 NOTE — DISCUSSION/SUMMARY
[FreeTextEntry1] : In summary, the patient is a 78-year-old right-handed man with history of decreased dexterity in the left arm since 2018.  Examination was significant for left sided rigidity and slowing, and mild hypomimia. A Fidel scan showed decreased uptake on the right. Overall, the history and examination is indeed probably most consistent with idiopathic Parkinson's disease. Given the strict asymmetry, I cannot rule out an atypical parkinsonian disorder at this time, though there were no findings and examination that would be specific for one. He was exposed to Agent Orange in the amount which does pose a risk factor for Parkinson's disease.  1. Continue LD  2. Sialorrhea: Myobloc per procedure note. Trial of atropine eye drops 3. Exercise as he is doing 4. Midodrine for orthostasis 5. Follow up in 3 months, with Dr Weller   We discussed the above impression, plan and recommendations during the visit. Counseling represented more then 50% of the 20 minute visit time

## 2024-08-13 NOTE — PROCEDURE
Concentration Of Solution Injected (Mg/Ml): 10.0 [FreeTextEntry1] : The patient received injections with botulinum toxin B (Myobloc), diluted at 500 units/0.1 cc via a 30 ga needle into the following sites, in the usual antiseptic manner. Possible side effects including over-weakening, dysphagia, bleeding, and local infection were discussed.  1. Left parotid gland 1250/2 2. Right parotid gland 1250/2  Total injected 2500 units, waste 0 units, total used 2500 units  The patient tolerated the procedure well, with not complications

## 2024-11-19 ENCOUNTER — APPOINTMENT (OUTPATIENT)
Dept: NEUROLOGY | Facility: CLINIC | Age: 79
End: 2024-11-19
Payer: MEDICARE

## 2024-11-19 VITALS
DIASTOLIC BLOOD PRESSURE: 84 MMHG | BODY MASS INDEX: 26.55 KG/M2 | WEIGHT: 196 LBS | HEIGHT: 72 IN | HEART RATE: 73 BPM | SYSTOLIC BLOOD PRESSURE: 125 MMHG

## 2024-11-19 DIAGNOSIS — K11.7 DISTURBANCES OF SALIVARY SECRETION: ICD-10-CM

## 2024-11-19 PROCEDURE — 99214 OFFICE O/P EST MOD 30 MIN: CPT | Mod: 25

## 2024-11-19 PROCEDURE — 64611 CHEMODENERV SALIV GLANDS: CPT

## 2024-11-19 RX ORDER — AMANTADINE HYDROCHLORIDE 100 1/1
100 TABLET ORAL
Qty: 90 | Refills: 0 | Status: ACTIVE | COMMUNITY
Start: 2024-11-19 | End: 1900-01-01

## 2025-01-08 ENCOUNTER — NON-APPOINTMENT (OUTPATIENT)
Age: 80
End: 2025-01-08

## 2025-01-28 ENCOUNTER — APPOINTMENT (OUTPATIENT)
Dept: NEUROLOGY | Facility: CLINIC | Age: 80
End: 2025-01-28

## 2025-02-26 ENCOUNTER — APPOINTMENT (OUTPATIENT)
Dept: NEUROLOGY | Facility: CLINIC | Age: 80
End: 2025-02-26
Payer: MEDICARE

## 2025-02-26 ENCOUNTER — NON-APPOINTMENT (OUTPATIENT)
Age: 80
End: 2025-02-26

## 2025-02-26 VITALS
DIASTOLIC BLOOD PRESSURE: 80 MMHG | HEIGHT: 72 IN | WEIGHT: 190 LBS | BODY MASS INDEX: 25.73 KG/M2 | HEART RATE: 76 BPM | SYSTOLIC BLOOD PRESSURE: 111 MMHG

## 2025-02-26 DIAGNOSIS — G20.A1 PARKINSON'S DISEASE WITHOUT DYSKINESIA, WITHOUT MENTION OF FLUCTUATIONS: ICD-10-CM

## 2025-02-26 PROCEDURE — 64611 CHEMODENERV SALIV GLANDS: CPT

## 2025-02-26 PROCEDURE — 99214 OFFICE O/P EST MOD 30 MIN: CPT | Mod: 25

## 2025-02-26 RX ORDER — CARBIDOPA AND LEVODOPA 25; 100 MG/1; MG/1
25-100 TABLET, EXTENDED RELEASE ORAL
Qty: 270 | Refills: 3 | Status: ACTIVE | OUTPATIENT
Start: 2025-02-26

## 2025-03-21 DIAGNOSIS — G20.A1 PARKINSON'S DISEASE WITHOUT DYSKINESIA, WITHOUT MENTION OF FLUCTUATIONS: ICD-10-CM

## 2025-05-08 ENCOUNTER — RX RENEWAL (OUTPATIENT)
Age: 80
End: 2025-05-08

## 2025-06-03 ENCOUNTER — APPOINTMENT (OUTPATIENT)
Dept: NEUROLOGY | Facility: CLINIC | Age: 80
End: 2025-06-03
Payer: MEDICARE

## 2025-06-03 DIAGNOSIS — R13.10 DYSPHAGIA, UNSPECIFIED: ICD-10-CM

## 2025-06-03 PROCEDURE — 99214 OFFICE O/P EST MOD 30 MIN: CPT | Mod: 25

## 2025-06-03 PROCEDURE — 64611 CHEMODENERV SALIV GLANDS: CPT

## 2025-07-23 ENCOUNTER — NON-APPOINTMENT (OUTPATIENT)
Age: 80
End: 2025-07-23

## 2025-07-23 ENCOUNTER — APPOINTMENT (OUTPATIENT)
Dept: RADIOLOGY | Facility: HOSPITAL | Age: 80
End: 2025-07-23
Payer: MEDICARE

## 2025-07-23 ENCOUNTER — OUTPATIENT (OUTPATIENT)
Dept: OUTPATIENT SERVICES | Facility: HOSPITAL | Age: 80
LOS: 1 days | End: 2025-07-23

## 2025-07-23 DIAGNOSIS — R13.10 DYSPHAGIA, UNSPECIFIED: ICD-10-CM

## 2025-07-23 PROCEDURE — 74230 X-RAY XM SWLNG FUNCJ C+: CPT | Mod: 26

## 2025-07-30 ENCOUNTER — NON-APPOINTMENT (OUTPATIENT)
Age: 80
End: 2025-07-30

## 2025-08-26 ENCOUNTER — APPOINTMENT (OUTPATIENT)
Dept: NEUROLOGY | Facility: CLINIC | Age: 80
End: 2025-08-26

## 2025-09-09 ENCOUNTER — APPOINTMENT (OUTPATIENT)
Dept: NEUROLOGY | Facility: CLINIC | Age: 80
End: 2025-09-09
Payer: MEDICARE

## 2025-09-09 ENCOUNTER — NON-APPOINTMENT (OUTPATIENT)
Age: 80
End: 2025-09-09

## 2025-09-09 VITALS
WEIGHT: 183 LBS | HEART RATE: 78 BPM | HEIGHT: 72 IN | DIASTOLIC BLOOD PRESSURE: 81 MMHG | BODY MASS INDEX: 24.79 KG/M2 | SYSTOLIC BLOOD PRESSURE: 125 MMHG

## 2025-09-09 DIAGNOSIS — K11.7 DISTURBANCES OF SALIVARY SECRETION: ICD-10-CM

## 2025-09-09 DIAGNOSIS — G20.A1 PARKINSON'S DISEASE WITHOUT DYSKINESIA, WITHOUT MENTION OF FLUCTUATIONS: ICD-10-CM

## 2025-09-09 PROCEDURE — 99214 OFFICE O/P EST MOD 30 MIN: CPT | Mod: 25

## 2025-09-09 PROCEDURE — 64611 CHEMODENERV SALIV GLANDS: CPT
